# Patient Record
Sex: FEMALE | Race: WHITE | NOT HISPANIC OR LATINO | Employment: OTHER | URBAN - METROPOLITAN AREA
[De-identification: names, ages, dates, MRNs, and addresses within clinical notes are randomized per-mention and may not be internally consistent; named-entity substitution may affect disease eponyms.]

---

## 2021-10-04 ENCOUNTER — MYC MEDICAL ADVICE (OUTPATIENT)
Dept: FAMILY MEDICINE | Facility: CLINIC | Age: 41
End: 2021-10-04

## 2021-10-05 NOTE — PROGRESS NOTES
Assessment & Plan     Encounter for screening for viral disease  covid testing ordered. Advised pt get on [employer] for mobile access to results.   - Asymptomatic COVID-19 Virus (Coronavirus) by PCR; Future    History of depression  Reviewed PHQ9 in detail. Her sx are chronic/stable. She is not interested in restarting a medication at this time  Invited to return to discuss if she changes her mind  Declined counseling referral    Encouraged to return for well woman exam, screening labs, pap, etc.          Depression Screening Follow Up    PHQ 10/7/2021   PHQ-9 Total Score 8   Q9: Thoughts of better off dead/self-harm past 2 weeks Several days   F/U: Thoughts of suicide or self-harm No   F/U: Safety concerns No       Return in about 2 weeks (around 10/21/2021) for Physical Exam.    Nevin Gamboa PA-C  [facility] EMERSON Sellers is a 40 year old who presents for the following health issues  History of Present Illness       She eats 2-3 servings of fruits and vegetables daily.She consumes 1 sweetened beverage(s) daily.She exercises with enough effort to increase her heart rate 9 or less minutes per day.  She exercises with enough effort to increase her heart rate 3 or less days per week.      Needs Covid test for travel- a Cruise leaving Saturday   Pt was vaccinated 09/18/2021- [employer].   Patient is leaving on 10/9 for her 7 day cruise. Needs negative covid test.   No known recent exposure to covid. 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  No recent sx at all- of cough, fever/chills, CP, URI sx, smell/taste change, etc.     Has been many years since needed to be seen. Healthy. No meds. Been a few years since prevent visit.       PHQ 10/7/2021   PHQ-9 Total Score 8   Q9: Thoughts of better off dead/self-harm past 2 weeks Several days   F/U: Thoughts of suicide or self-harm No   F/U: Safety concerns No   I have been on antidepressants for  "years.  Anxiety and depression.   Off meds the last 3-4 yrs. Stressful year. Daughter is a senior in high school.  I don't like being on medications.  Meds worked when needed them. Thinks last took celexa.   SI on questionnaire- \"It's true but its not that bad. Regular daily occurrence since teen years\"- brief, passive, stable pattern, no plan, no intent.           Review of Systems   Constitutional, HEENT, cardiovascular, pulmonary, gi and gu systems are negative, except as otherwise noted.      Objective    /66   Pulse 76   Temp 97.1  F (36.2  C) (Temporal)   Resp 16   Wt 68.8 kg (151 lb 11.2 oz)   LMP 09/22/2021 (Approximate)   SpO2 97%   Breastfeeding No   There is no height or weight on file to calculate BMI.  Physical Exam   GENERAL: healthy, alert and no distress  EYES: Eyes grossly normal to inspection, PERRL and conjunctivae and sclerae normal  HENT: ear canals and TM's normal, nose and mouth without ulcers or lesions  NECK: no adenopathy, no asymmetry, masses, or scars and thyroid normal to palpation  RESP: lungs clear to auscultation - no rales, rhonchi or wheezes  CV: regular rate and rhythm, normal S1 S2, no S3 or S4, no murmur, click or rub, no peripheral edema and peripheral pulses strong  ABDOMEN: soft, nontender, no hepatosplenomegaly, no masses and bowel sounds normal  MS: no gross musculoskeletal defects noted, no edema  NEURO: Normal strength and tone, mentation intact and speech normal  PSYCH: mentation appears normal, affect normal/bright                Answers for HPI/ROS submitted by the patient on 10/7/2021  If you checked off any problems, how difficult have these problems made it for you to do your work, take care of things at home, or get along with other people?: Not difficult at all  PHQ9 TOTAL SCORE: 8      "

## 2021-10-07 ENCOUNTER — OFFICE VISIT (OUTPATIENT)
Dept: FAMILY MEDICINE | Facility: CLINIC | Age: 41
End: 2021-10-07
Payer: COMMERCIAL

## 2021-10-07 VITALS
TEMPERATURE: 97.1 F | WEIGHT: 151.7 LBS | OXYGEN SATURATION: 97 % | HEART RATE: 76 BPM | RESPIRATION RATE: 16 BRPM | SYSTOLIC BLOOD PRESSURE: 102 MMHG | DIASTOLIC BLOOD PRESSURE: 66 MMHG

## 2021-10-07 DIAGNOSIS — Z11.59 ENCOUNTER FOR SCREENING FOR VIRAL DISEASE: Primary | ICD-10-CM

## 2021-10-07 DIAGNOSIS — Z86.59 HISTORY OF DEPRESSION: ICD-10-CM

## 2021-10-07 PROCEDURE — U0003 INFECTIOUS AGENT DETECTION BY NUCLEIC ACID (DNA OR RNA); SEVERE ACUTE RESPIRATORY SYNDROME CORONAVIRUS 2 (SARS-COV-2) (CORONAVIRUS DISEASE [COVID-19]), AMPLIFIED PROBE TECHNIQUE, MAKING USE OF HIGH THROUGHPUT TECHNOLOGIES AS DESCRIBED BY CMS-2020-01-R: HCPCS | Performed by: PHYSICIAN ASSISTANT

## 2021-10-07 PROCEDURE — U0005 INFEC AGEN DETEC AMPLI PROBE: HCPCS | Performed by: PHYSICIAN ASSISTANT

## 2021-10-07 PROCEDURE — 99203 OFFICE O/P NEW LOW 30 MIN: CPT | Performed by: PHYSICIAN ASSISTANT

## 2021-10-07 ASSESSMENT — PATIENT HEALTH QUESTIONNAIRE - PHQ9
SUM OF ALL RESPONSES TO PHQ QUESTIONS 1-9: 8
10. IF YOU CHECKED OFF ANY PROBLEMS, HOW DIFFICULT HAVE THESE PROBLEMS MADE IT FOR YOU TO DO YOUR WORK, TAKE CARE OF THINGS AT HOME, OR GET ALONG WITH OTHER PEOPLE: NOT DIFFICULT AT ALL
SUM OF ALL RESPONSES TO PHQ QUESTIONS 1-9: 8

## 2021-10-07 ASSESSMENT — PAIN SCALES - GENERAL: PAINLEVEL: NO PAIN (0)

## 2021-10-08 LAB — SARS-COV-2 RNA RESP QL NAA+PROBE: NEGATIVE

## 2021-10-08 ASSESSMENT — PATIENT HEALTH QUESTIONNAIRE - PHQ9: SUM OF ALL RESPONSES TO PHQ QUESTIONS 1-9: 8

## 2021-10-17 ENCOUNTER — HEALTH MAINTENANCE LETTER (OUTPATIENT)
Age: 41
End: 2021-10-17

## 2021-12-22 ENCOUNTER — OFFICE VISIT (OUTPATIENT)
Dept: FAMILY MEDICINE | Facility: CLINIC | Age: 41
End: 2021-12-22
Payer: COMMERCIAL

## 2021-12-22 VITALS
SYSTOLIC BLOOD PRESSURE: 128 MMHG | WEIGHT: 140 LBS | RESPIRATION RATE: 16 BRPM | OXYGEN SATURATION: 98 % | TEMPERATURE: 98.6 F | HEART RATE: 84 BPM | DIASTOLIC BLOOD PRESSURE: 74 MMHG

## 2021-12-22 DIAGNOSIS — J02.9 SORE THROAT: Primary | ICD-10-CM

## 2021-12-22 LAB
DEPRECATED S PYO AG THROAT QL EIA: NEGATIVE
GROUP A STREP BY PCR: NOT DETECTED

## 2021-12-22 PROCEDURE — U0005 INFEC AGEN DETEC AMPLI PROBE: HCPCS | Performed by: FAMILY MEDICINE

## 2021-12-22 PROCEDURE — U0003 INFECTIOUS AGENT DETECTION BY NUCLEIC ACID (DNA OR RNA); SEVERE ACUTE RESPIRATORY SYNDROME CORONAVIRUS 2 (SARS-COV-2) (CORONAVIRUS DISEASE [COVID-19]), AMPLIFIED PROBE TECHNIQUE, MAKING USE OF HIGH THROUGHPUT TECHNOLOGIES AS DESCRIBED BY CMS-2020-01-R: HCPCS | Performed by: FAMILY MEDICINE

## 2021-12-22 PROCEDURE — 87651 STREP A DNA AMP PROBE: CPT | Performed by: FAMILY MEDICINE

## 2021-12-22 PROCEDURE — 99214 OFFICE O/P EST MOD 30 MIN: CPT | Performed by: FAMILY MEDICINE

## 2021-12-22 RX ORDER — AMOXICILLIN 875 MG
875 TABLET ORAL 2 TIMES DAILY
Qty: 14 TABLET | Refills: 0 | Status: SHIPPED | OUTPATIENT
Start: 2021-12-22 | End: 2021-12-29

## 2021-12-22 NOTE — PROGRESS NOTES
Assessment & Plan     Sore throat  Symptoms began about 5 days ago with mild cold symptoms and a scratchy throat.  The other illness symptoms have faded but she has a very severe sore throat.  No loss of smell or taste.  No fever or chills and not particularly fatigued.  Had Damian & Damian vaccine in September and does not know of any particular exposures.  Rapid strep negative.  I obtained a Covid swab today.  Given the focal symptoms I am okay with starting antibiotic therapy until we get culture results back.  We will contact patient with those results.  - Streptococcus A Rapid Screen w/Reflex to PCR - Clinic Collect  - Group A Streptococcus PCR Throat Swab  - amoxicillin (AMOXIL) 875 MG tablet; Take 1 tablet (875 mg) by mouth 2 times daily for 7 days  - Symptomatic; Yes; 12/18/2021 COVID-19 Virus (Coronavirus) by PCR Nose       See Patient Instructions    Return in about 2 days (around 12/24/2021) for Based upon lab results.    Peyton Collins MD  [facility]    Yolanda Sellers is a 40 year old who presents for the following health issues     HPI     Acute Illness  Acute illness concerns: strep throat  Onset/Duration: saturday  Symptoms:  Fever: YES- low grade  Chills/Sweats: no  Headache (location?): no  Sinus Pressure: no  Conjunctivitis:  no  Ear Pain: YES- on and off  Rhinorrhea: no  Congestion: no  Sore Throat: YES  Cough: YES-dry  Wheeze: no  Decreased Appetite: no  Nausea: no  Vomiting: no  Diarrhea: no  Dysuria/Freq.: no  Dysuria or Hematuria: no  Fatigue/Achiness: YES  Sick/Strep Exposure: YES- daughter was feeling sick over the weekend  Therapies tried and outcome: dayquil, nyquil,     Here today for illness symptoms as above.  Primarily sore throat.    Review of Systems   Constitutional, HEENT, cardiovascular, pulmonary, gi and gu systems are negative, except as otherwise noted.      Objective    /74   Pulse 84   Temp 98.6  F (37  C) (Temporal)   Resp 16    Wt 63.5 kg (140 lb)   SpO2 98%   There is no height or weight on file to calculate BMI.  Physical Exam   Alert, pleasant, upbeat, and in no apparent discomfort.  Ears normal. Throat and pharynx normal. Neck supple. No adenopathy or masses in the neck or supraclavicular regions. Sinuses non tender.  S1 and S2 normal, no murmurs, clicks, gallops or rubs. Regular rate and rhythm. Chest is clear; no wheezes or rales. No edema or JVD.  Past labs reviewed with the patient.     Rapid strep negative

## 2021-12-23 ENCOUNTER — TELEPHONE (OUTPATIENT)
Dept: FAMILY MEDICINE | Facility: CLINIC | Age: 41
End: 2021-12-23

## 2021-12-23 ENCOUNTER — TELEPHONE (OUTPATIENT)
Dept: FAMILY MEDICINE | Facility: CLINIC | Age: 41
End: 2021-12-23
Payer: COMMERCIAL

## 2021-12-23 LAB — SARS-COV-2 RNA RESP QL NAA+PROBE: POSITIVE

## 2021-12-23 NOTE — RESULT ENCOUNTER NOTE
Verito,  Your final strep test was again negative. The covid-19 test is still processing.  Please MyChart or call if you have any concerns or questions.   Sincerely,  Annalise Clinton MD  (for Dr. Collins who is out of the office today)

## 2021-12-24 NOTE — TELEPHONE ENCOUNTER
Coronavirus (COVID-19) Notification    Reason for call  Notify of POSITIVE  COVID-19 lab result, assess symptoms,  review [facility] recommendations    Lab Result   Lab test for 2019-nCoV rRt-PCR or SARS-COV-2 PCR  Oropharyngeal AND/OR nasopharyngeal swabs were POSITIVE for 2019-nCoV RNA [OR] SARS-COV-2 RNA (COVID-19) RNA     We have been unable to reach Patient by phone at this time to notify of their Positive COVID-19 result.  Left voicemail message requesting a call back to [phone] [facility] for results.        POSITIVE COVID-19 Letter sent.    Unable to leave message, phone disconnected.  Jo Ann Cueto LPN

## 2022-11-21 ENCOUNTER — HEALTH MAINTENANCE LETTER (OUTPATIENT)
Age: 42
End: 2022-11-21

## 2023-03-13 ENCOUNTER — TRANSFERRED RECORDS (OUTPATIENT)
Dept: HEALTH INFORMATION MANAGEMENT | Facility: CLINIC | Age: 43
End: 2023-03-13

## 2023-03-27 ENCOUNTER — TRANSFERRED RECORDS (OUTPATIENT)
Dept: HEALTH INFORMATION MANAGEMENT | Facility: CLINIC | Age: 43
End: 2023-03-27

## 2023-11-26 ENCOUNTER — HEALTH MAINTENANCE LETTER (OUTPATIENT)
Age: 43
End: 2023-11-26

## 2024-02-04 ENCOUNTER — HEALTH MAINTENANCE LETTER (OUTPATIENT)
Age: 44
End: 2024-02-04

## 2024-04-04 ENCOUNTER — TRANSCRIBE ORDERS (OUTPATIENT)
Dept: OTHER | Age: 44
End: 2024-04-04

## 2024-04-04 ENCOUNTER — MEDICAL CORRESPONDENCE (OUTPATIENT)
Dept: HEALTH INFORMATION MANAGEMENT | Facility: CLINIC | Age: 44
End: 2024-04-04
Payer: COMMERCIAL

## 2024-04-04 ENCOUNTER — PRE VISIT (OUTPATIENT)
Dept: OTHER | Age: 44
End: 2024-04-04

## 2024-04-04 ENCOUNTER — PATIENT OUTREACH (OUTPATIENT)
Dept: ONCOLOGY | Facility: CLINIC | Age: 44
End: 2024-04-04
Payer: COMMERCIAL

## 2024-04-04 ENCOUNTER — TRANSFERRED RECORDS (OUTPATIENT)
Dept: HEALTH INFORMATION MANAGEMENT | Facility: CLINIC | Age: 44
End: 2024-04-04
Payer: COMMERCIAL

## 2024-04-04 DIAGNOSIS — R79.89 ELEVATED SERUM HUMAN CHORIONIC GONADOTROPIN (HCG) LEVEL IN FEMALE, NOT PREGNANT: Primary | ICD-10-CM

## 2024-04-04 NOTE — PROGRESS NOTES
"New Patient Hematology / Oncology Nurse Navigator Note     Referral Date: 4/4/24    Referring provider:   Lyric Marr MD   [address] [place] [address]   Phone: [phone]   Fax: [phone]       Referring Clinic/Organization: Other - Clinic [facility]     Referred to: GynOnc    Requested provider (if applicable): First available - did not specify     Evaluation for : \"Abnormal quantitiative HCG s/p Ultrasound guided suction D&C, Concern for possible invasive gestational cells \"       Clinical History (per Nurse review of records provided):    3/26/24 Path:  Final Diagnosis    Uterine contents - Endometrium with gestational-type changes and previous implantation site. No chorionic villi identified.   HGC: 2/21/24 (7180)> 2/28/24 (945)> 3/11/24 (223)<3/19/24 (247), 3/22/24 (275)<4/2/24 (318) -- BOOKMARKED  Most recent HCG  4/2    3/26/24 Op Note: SURG RX MISSED ABORTN,1ST TRI   ULTRASONIC GUIDANCE, INTRAOPE* SUCTION DILATION AND CURETTAGE WITH ULTRASOUND GUIDANCE  -- BOOKMARKED  Relevant imaging -- BOOKMARKED    Clinical Assessment / Barriers to Care (Per Nurse):  Pt lives in [place], [place]      Records Location: [facility]   Faxed - Media tab/Scanned     Records Needed:   Images from [facility]    Additional testing needed prior to consult:   N/A    Referral updates and Plan:   OUTGOING CALL to pt:  Introduced my role as nurse navigator with [facility] Hematology/Oncology dept and that we have recd the referral to GynOnc from Dr Marr.  Pt confirms they are aware of the referral but would like to check with her insurance to confirm we are in network.     Provided contact information if future questions arise.      Tentative hold placed on Dr. Sommer's schedule Monday 4/8/24 0815 at [facility] pending pt's insurance coverage. Will have NPS follow-up tomorrow to schedule accordingly.     Dayanna Sutherland, BSN, RN, PHN, OCN  Hematology/Oncology Nurse Navigator  [facility] " Care  [phone]

## 2024-04-05 NOTE — TELEPHONE ENCOUNTER
RECORDS STATUS - ALL OTHER DIAGNOSIS      Action April 4, 2024 11:45 AM    Action Taken - Request is faxed to [facility] to email image.   - Called and left  for [facility] to push img.      Imaging Received  April 5, 2024 2:45 PM    Action: Images from [facility] received and resolved to PACS.     RECORDS RECEIVED FROM: [facility]   DATE RECEIVED:    NOTES STATUS DETAILS   OFFICE NOTE from referring provider External: [facility] 3/21/24: Dr. Lyric Marr   OPERATIVE REPORT [facility]- [facility] 3/26/24: SURG RX MISSED ABORTN,1ST TRI   ULTRASONIC GUIDANCE, INTRAOPE* SUCTION DILATION AND CURETTAGE WITH ULTRASOUND GUIDANCE    MEDICATION LIST External: [facility]    LABS     PATHOLOGY REPORTS Report in CE- NM 3/26/24: [id number]    ANYTHING RELATED TO DIAGNOSIS CE- NM Most recent 3/26/24   IMAGING (NEED IMAGES & REPORT)     ULTRASOUND (Img req from /[facility]) [facility]:   3/26/24: US Intraoperative    [facility]:  3/21/24: US Pel

## 2024-04-05 NOTE — PROGRESS NOTES
Gynecologic Oncology Clinic - New Patient    Referring provider:    Lyric Marr MD  [address] [address]    Patient: Verito Yee  : 1980    Date of Visit: 2024     Reason for visit: Rising HCG    History of Present Illness:  Verito Yee is a 43 year old  patient with a rising HCG in the setting of a recent  SAB with retained POCs on ultrasound prior to D&C.    2/15/24 TVUS: nonviable fetal pole measuring 6w0d. Normal bilateral adnexa. The patient underwent expectant management.     2 weeks later she began having heavy bleeding and cramping.  This lasted 2 weeks.     : HCG 7180  :    3/11:   3/19:     3/21/24 TVUS: Retroverted uterus, endometrium with heterogenous material with vascularity. Normal bilateral ovaries, no free fluid.     3/22:     3/26: D&C   Path: Endometrium with gestational type changes and previous implantation site, no chorionic villi identified.     :     She started spotting 2 days ago.  Only one day of light spotting.  Normally has monthly menses, lasting 4 days.  Not preventing a pregnancy.  She has no other concerns or complaints today.    Screening History  Colonoscopy: never  Mammogram: Unsure   Cervical cancer screenin normal    OB/Gynecologic History:  Deliveries: ,  x2    Cervical cancer screening: See screening history above. Additional details:   Regular screening: Yes  History of abnormal: No      History reviewed. No pertinent past medical history.    Past Surgical History:   Procedure Laterality Date    DILATION AND CURETTAGE      TONSILLECTOMY          Social History     Tobacco Use    Smoking status: Never    Smokeless tobacco: Never   Vaping Use    Vaping Use: Never used   Substance Use Topics    Alcohol use: Yes     Comment: 6 drinks per week    Drug use: Never   Current living situation: Lives in [place] with her son and fiance and occasionally his 4  "children.      Family History   Problem Relation Age of Onset    Heart Disease Father          Allergies  No Known Allergies    Current Outpatient Medications   Medication Sig Dispense Refill    norethindrone-ethinyl estradiol (MICROGESTIN ) 1-20 MG-MCG tablet Take 1 tablet by mouth daily 30 tablet 2     No current facility-administered medications for this visit.       Physical Exam:   BP (!) 141/92 (BP Location: Right arm)   Pulse 98   Resp 16   Ht 1.626 m (5' 4\")   Wt 69.2 kg (152 lb 9.6 oz)   LMP 2024 (Exact Date)   SpO2 95%   BMI 26.19 kg/m    General Appearance: healthy and alert, no distress        Cardiovascular: regular rate and rhythm    Respiratory: lungs clear     Gastrointestinal:       abdomen soft, non-tender, non-distended, no organomegaly or masses    Genitourinary: External genitalia and urethral meatus appears normal.  Vagina is smooth without nodularity or masses.  Cervix appears normal and without lesions.  Bimanual exam reveal no masses, nodularity or fullness.      Labs/Pathology:   As above    Imaging:   As above      Assessment:  Verito Yee is a 43 year old  patient with a rising HCG in the setting of a recent SAB with retained POCs on ultrasound prior to D&C.    Plan:   Discussed with the patient that her hCG is not downtrending appropriately.  The major concern in this situation is GTN.  Her clinical picture is not completely clear given her recent SAB.  I did discuss that her pathology on her D&C did not reveal any chorionic villi.  Reviewed that it is possible that there is still some retained products, though I am concerned that her hCG has risen and there may be something more going on.  Discussed my recommendation to obtain a repeat ultrasound to assess for any continued retained products.  If there is evidence that there is still retained products, then I would recommend we repeat the D&C as this may still be retained POC's.  If her ultrasound is negative " for any evidence of retained products or ectopic pregnancy, I would recommend completing staging for GTN with a CT chest abdomen pelvis.  She technically does meet criteria for GTN with a plateau of 4 values over 3 weeks despite a interim D&C but I do think this is difficult to assess in the setting retained products of conception, thus we will get more information prior to proceeding with treatment for GTN.  Briefly discussed options for management if we ultimately decide this is GTN and that this would be dependent on her WHO score.  I also discussed the importance of contraception during this time.  I would recommend that we start her on OCPs as this may also suppress other possible contributors to an elevated hCG.  We will plan to obtain another hCG on 4/9.   -TVUS ordered   -plan for D&C under US guidance in the  surgery center   -[facility] to be drawn on 4/9    Carson Sommer MD

## 2024-04-08 ENCOUNTER — ANCILLARY PROCEDURE (OUTPATIENT)
Dept: ULTRASOUND IMAGING | Facility: OTHER | Age: 44
End: 2024-04-08
Attending: OBSTETRICS & GYNECOLOGY
Payer: COMMERCIAL

## 2024-04-08 ENCOUNTER — PATIENT OUTREACH (OUTPATIENT)
Dept: ONCOLOGY | Facility: CLINIC | Age: 44
End: 2024-04-08

## 2024-04-08 ENCOUNTER — HOSPITAL ENCOUNTER (OUTPATIENT)
Facility: AMBULATORY SURGERY CENTER | Age: 44
End: 2024-04-08
Attending: OBSTETRICS & GYNECOLOGY
Payer: COMMERCIAL

## 2024-04-08 ENCOUNTER — ONCOLOGY VISIT (OUTPATIENT)
Dept: ONCOLOGY | Facility: CLINIC | Age: 44
End: 2024-04-08
Attending: OBSTETRICS & GYNECOLOGY
Payer: COMMERCIAL

## 2024-04-08 ENCOUNTER — TELEPHONE (OUTPATIENT)
Dept: ONCOLOGY | Facility: CLINIC | Age: 44
End: 2024-04-08

## 2024-04-08 VITALS
BODY MASS INDEX: 26.05 KG/M2 | HEART RATE: 98 BPM | RESPIRATION RATE: 16 BRPM | DIASTOLIC BLOOD PRESSURE: 92 MMHG | OXYGEN SATURATION: 95 % | SYSTOLIC BLOOD PRESSURE: 141 MMHG | HEIGHT: 64 IN | WEIGHT: 152.6 LBS

## 2024-04-08 DIAGNOSIS — R79.89 ELEVATED SERUM HCG: Primary | ICD-10-CM

## 2024-04-08 DIAGNOSIS — R79.89 ELEVATED SERUM HCG: ICD-10-CM

## 2024-04-08 PROCEDURE — 99204 OFFICE O/P NEW MOD 45 MIN: CPT | Performed by: OBSTETRICS & GYNECOLOGY

## 2024-04-08 PROCEDURE — 76856 US EXAM PELVIC COMPLETE: CPT | Mod: TC | Performed by: RADIOLOGY

## 2024-04-08 PROCEDURE — G0463 HOSPITAL OUTPT CLINIC VISIT: HCPCS | Performed by: OBSTETRICS & GYNECOLOGY

## 2024-04-08 PROCEDURE — 76830 TRANSVAGINAL US NON-OB: CPT | Mod: TC | Performed by: RADIOLOGY

## 2024-04-08 RX ORDER — FENTANYL CITRATE 50 UG/ML
25 INJECTION, SOLUTION INTRAMUSCULAR; INTRAVENOUS EVERY 5 MIN PRN
Status: CANCELLED | OUTPATIENT
Start: 2024-04-08

## 2024-04-08 RX ORDER — ONDANSETRON 2 MG/ML
4 INJECTION INTRAMUSCULAR; INTRAVENOUS EVERY 30 MIN PRN
Status: CANCELLED | OUTPATIENT
Start: 2024-04-08

## 2024-04-08 RX ORDER — NORETHINDRONE ACETATE AND ETHINYL ESTRADIOL .02; 1 MG/1; MG/1
1 TABLET ORAL DAILY
Qty: 30 TABLET | Refills: 2 | Status: SHIPPED | OUTPATIENT
Start: 2024-04-08 | End: 2024-05-29

## 2024-04-08 RX ORDER — PHENAZOPYRIDINE HYDROCHLORIDE 100 MG/1
200 TABLET, FILM COATED ORAL ONCE
Status: CANCELLED | OUTPATIENT
Start: 2024-04-08 | End: 2024-04-08

## 2024-04-08 RX ORDER — ACETAMINOPHEN 325 MG/1
975 TABLET ORAL ONCE
Status: CANCELLED | OUTPATIENT
Start: 2024-04-08 | End: 2024-04-08

## 2024-04-08 RX ORDER — LIDOCAINE 40 MG/G
CREAM TOPICAL
Status: CANCELLED | OUTPATIENT
Start: 2024-04-08

## 2024-04-08 RX ORDER — FENTANYL CITRATE 50 UG/ML
50 INJECTION, SOLUTION INTRAMUSCULAR; INTRAVENOUS EVERY 5 MIN PRN
Status: CANCELLED | OUTPATIENT
Start: 2024-04-08

## 2024-04-08 RX ORDER — DOXYCYCLINE 100 MG/1
200 CAPSULE ORAL ONCE
Qty: 2 CAPSULE | Refills: 0 | Status: CANCELLED | OUTPATIENT
Start: 2024-04-08 | End: 2024-04-08

## 2024-04-08 RX ORDER — SODIUM CHLORIDE, SODIUM LACTATE, POTASSIUM CHLORIDE, CALCIUM CHLORIDE 600; 310; 30; 20 MG/100ML; MG/100ML; MG/100ML; MG/100ML
INJECTION, SOLUTION INTRAVENOUS CONTINUOUS
Status: CANCELLED | OUTPATIENT
Start: 2024-04-08

## 2024-04-08 RX ORDER — ONDANSETRON 4 MG/1
4 TABLET, ORALLY DISINTEGRATING ORAL EVERY 30 MIN PRN
Status: CANCELLED | OUTPATIENT
Start: 2024-04-08

## 2024-04-08 RX ORDER — HEPARIN SODIUM 10000 [USP'U]/ML
5000 INJECTION, SOLUTION INTRAVENOUS; SUBCUTANEOUS
Status: CANCELLED | OUTPATIENT
Start: 2024-04-08

## 2024-04-08 RX ORDER — NALOXONE HYDROCHLORIDE 0.4 MG/ML
0.1 INJECTION, SOLUTION INTRAMUSCULAR; INTRAVENOUS; SUBCUTANEOUS
Status: CANCELLED | OUTPATIENT
Start: 2024-04-08

## 2024-04-08 ASSESSMENT — PAIN SCALES - GENERAL: PAINLEVEL: NO PAIN (0)

## 2024-04-08 NOTE — TELEPHONE ENCOUNTER
Spoke with the patient and was able to confirm all scheduled information.     Patient is schedule for surgery with: Dr. Sommer    Surgery Date: 4/9     Location: [facility]    H&P: already completed by surgeon will complete and/or update on day of surgery as needed      Post-op: will be scheduled by the clinic    Patient will receive a phone call from pre-admission nurses 1-2 days prior to surgery with arrival time and NPO instructions.    Patient aware times are subject to change up until day before surgery.     Patient questions/concerns:  Patient asked about hcg hormone lab she has scheduled for 4/9 ay 1330. Per Dr. Sommer, they will draw lab in the AM prior to surgery. RNCC Angela was to call patient to let them know the plan.       Surgery packet was provided to patient during appointment      Clementine Rivas on 4/8/2024 at 3:07 PM

## 2024-04-08 NOTE — LETTER
2024         RE: Verito Yee  [address] [facility] [address]        Dear Colleague,    Thank you for referring your patient, Verito Yee, to the [facility]. Please see a copy of my visit note below.    Gynecologic Oncology Clinic - New Patient    Referring provider:    Lyric Marr MD  [address] [address]    Patient: Verito Yee  : 1980    Date of Visit: 2024     Reason for visit: Rising HCG    History of Present Illness:  Verito Yee is a 43 year old  patient with a rising HCG in the setting of a recent  SAB with retained POCs on ultrasound prior to D&C.    2/15/24 TVUS: nonviable fetal pole measuring 6w0d. Normal bilateral adnexa. The patient underwent expectant management.     2 weeks later she began having heavy bleeding and cramping.  This lasted 2 weeks.     : HCG 7180  :    3/11:   3/19:     3/21/24 TVUS: Retroverted uterus, endometrium with heterogenous material with vascularity. Normal bilateral ovaries, no free fluid.     3/22:     3/26: D&C   Path: Endometrium with gestational type changes and previous implantation site, no chorionic villi identified.     :     She started spotting 2 days ago.  Only one day of light spotting.  Normally has monthly menses, lasting 4 days.  Not preventing a pregnancy.  She has no other concerns or complaints today.    Screening History  Colonoscopy: never  Mammogram: Unsure   Cervical cancer screenin normal    OB/Gynecologic History:  Deliveries: ,  x2    Cervical cancer screening: See screening history above. Additional details:   Regular screening: Yes  History of abnormal: No      History reviewed. No pertinent past medical history.    Past Surgical History:   Procedure Laterality Date     DILATION AND CURETTAGE       TONSILLECTOMY          Social History     Tobacco Use     Smoking status: Never      "Smokeless tobacco: Never   Vaping Use     Vaping Use: Never used   Substance Use Topics     Alcohol use: Yes     Comment: 6 drinks per week     Drug use: Never   Current living situation: Lives in [place] with her son and faustinoance and occasionally his 4 children.      Family History   Problem Relation Age of Onset     Heart Disease Father          Allergies  No Known Allergies    Current Outpatient Medications   Medication Sig Dispense Refill     norethindrone-ethinyl estradiol (MICROGESTIN ) 1-20 MG-MCG tablet Take 1 tablet by mouth daily 30 tablet 2     No current facility-administered medications for this visit.       Physical Exam:   BP (!) 141/92 (BP Location: Right arm)   Pulse 98   Resp 16   Ht 1.626 m (5' 4\")   Wt 69.2 kg (152 lb 9.6 oz)   LMP 2024 (Exact Date)   SpO2 95%   BMI 26.19 kg/m    General Appearance: healthy and alert, no distress        Cardiovascular: regular rate and rhythm    Respiratory: lungs clear     Gastrointestinal:       abdomen soft, non-tender, non-distended, no organomegaly or masses    Genitourinary: External genitalia and urethral meatus appears normal.  Vagina is smooth without nodularity or masses.  Cervix appears normal and without lesions.  Bimanual exam reveal no masses, nodularity or fullness.      Labs/Pathology:   As above    Imaging:   As above      Assessment:  Verito Yee is a 43 year old  patient with a rising HCG in the setting of a recent SAB with retained POCs on ultrasound prior to D&C.    Plan:   Discussed with the patient that her hCG is not downtrending appropriately.  The major concern in this situation is GTN.  Her clinical picture is not completely clear given her recent SAB.  I did discuss that her pathology on her D&C did not reveal any chorionic villi.  Reviewed that it is possible that there is still some retained products, though I am concerned that her hCG has risen and there may be something more going on.  Discussed my " recommendation to obtain a repeat ultrasound to assess for any continued retained products.  If there is evidence that there is still retained products, then I would recommend we repeat the D&C as this may still be retained POC's.  If her ultrasound is negative for any evidence of retained products or ectopic pregnancy, I would recommend completing staging for GTN with a CT chest abdomen pelvis.  She technically does meet criteria for GTN with a plateau of 4 values over 3 weeks despite a interim D&C but I do think this is difficult to assess in the setting retained products of conception, thus we will get more information prior to proceeding with treatment for GTN.  Briefly discussed options for management if we ultimately decide this is GTN and that this would be dependent on her WHO score.  I also discussed the importance of contraception during this time.  I would recommend that we start her on OCPs as this may also suppress other possible contributors to an elevated hCG.  We will plan to obtain another hCG on 4/9.   -TVUS ordered   -plan for D&C under US guidance in the  surgery center   -[facility] to be drawn on 4/9    Carson Sommer MD           Again, thank you for allowing me to participate in the care of your patient.        Sincerely,        Carson Sommer MD

## 2024-04-08 NOTE — TELEPHONE ENCOUNTER
Per a text  writer rec'd from Dr. Sommer on 4/8, patients US came back negative and procedure needed to be cancelled. Writer called over to [facility] and cancelled procedure. Dr. Sommer stated that she would follow up with the patient. Clementine Rivas on 4/8/2024 at 3:59 PM

## 2024-04-08 NOTE — PROGRESS NOTES
[facility], Gynecologic Oncology:  Pre-op Education Note    Relevant Diagnosis:  Rising HCG     Procedure:  DILATION AND CURETTAGE, UTERUS, USING SUCTION, WITH ULTRASOUND GUIDANCE     Procedure Date: 4/9/24    Person(s) involved in teaching:  Patient and significant other    Education was completed: in person.    Motivation Level:    Asks Questions:   Yes  Eager to Learn:  Yes  Cooperative:  Yes  Receptive (willing/able to accept information):  Yes    Patient demonstrates understanding of the following:  Reason for the appointment, diagnosis and treatment plan:  Yes  Knowledge of proper use of medications and conditions for which they are ordered (with special attention to potential side effects or drug interactions):  Yes  Which situations necessitate calling provider and whom to contact:  Yes    Teaching Concerns:  No    Education/Instructional Materials Used/Given:     Before Your Surgery Booklet ([place])  Showering Before Surgery; A bottle of CHG soap was provided.  Lymphedema: A Patient Resource Guide  Hysterectomy Guidelines Yes  Educational Handout Pertaining to Procedure and/or Diagnosis: Yes  Home Care After Gynecologic Surgery  [facility]  Accommodations Brochure   Phone numbers for [facility] and After-Hours Line    Pre-op tests:   EKG: N/A  Labs: to be done  Chest X-Ray: N/A    Pre-op appointment: today    Post-op appointment: clinic to make    Time spent teaching with patient:  20 minutes    Hysterectomy Acknowledgement Statement form signed today: N/A - not required.    E-Consent for surgery signed today: Yes.    E-Consent for possible blood transfusion signed today: Yes.    Surgery scheduling team at [facility] was notified, and will be contacting patient directly to schedule her surgery.  Encouraged patient to call with any questions or concerns in the meantime.    Angela Raza, RN, BSN, OCN  RN Care Coordinator - Oncology  [facility]  [facility]     Universal Safety Interventions

## 2024-04-08 NOTE — NURSING NOTE
"Oncology Rooming Note    April 8, 2024 8:26 AM   Verito Yee is a 43 year old female who presents for:    Chief Complaint   Patient presents with    Oncology Clinic Visit     New patient     Initial Vitals: BP (!) 141/92 (BP Location: Right arm)   Pulse 98   Resp 16   Ht 1.626 m (5' 4\")   Wt 69.2 kg (152 lb 9.6 oz)   LMP 04/08/2024 (Exact Date)   SpO2 95%   BMI 26.19 kg/m   Estimated body mass index is 26.19 kg/m  as calculated from the following:    Height as of this encounter: 1.626 m (5' 4\").    Weight as of this encounter: 69.2 kg (152 lb 9.6 oz). Body surface area is 1.77 meters squared.  No Pain (0) Comment: Data Unavailable   Patient's last menstrual period was 04/08/2024 (exact date).  Allergies reviewed: Yes  Medications reviewed: Yes    Medications: Medication refills not needed today.  Pharmacy name entered into [facility]: [id number] - [place], [place] - [address]    Frailty Screening:   Is the patient here for a new oncology consult visit in cancer care? 2. No      Clinical concerns: New patient       Angle Ayala LPN              "

## 2024-04-09 ENCOUNTER — LAB (OUTPATIENT)
Dept: LAB | Facility: CLINIC | Age: 44
End: 2024-04-09
Payer: COMMERCIAL

## 2024-04-09 ENCOUNTER — ANCILLARY PROCEDURE (OUTPATIENT)
Dept: CT IMAGING | Facility: CLINIC | Age: 44
End: 2024-04-09
Attending: OBSTETRICS & GYNECOLOGY
Payer: COMMERCIAL

## 2024-04-09 DIAGNOSIS — R79.89 ELEVATED SERUM HCG: ICD-10-CM

## 2024-04-09 LAB — HCG INTACT+B SERPL-ACNC: 266 MIU/ML

## 2024-04-09 PROCEDURE — 74177 CT ABD & PELVIS W/CONTRAST: CPT | Mod: GC | Performed by: RADIOLOGY

## 2024-04-09 PROCEDURE — 71260 CT THORAX DX C+: CPT | Mod: GC | Performed by: RADIOLOGY

## 2024-04-09 PROCEDURE — 84702 CHORIONIC GONADOTROPIN TEST: CPT

## 2024-04-09 PROCEDURE — 36415 COLL VENOUS BLD VENIPUNCTURE: CPT

## 2024-04-09 RX ORDER — IOPAMIDOL 755 MG/ML
84 INJECTION, SOLUTION INTRAVASCULAR ONCE
Status: COMPLETED | OUTPATIENT
Start: 2024-04-09 | End: 2024-04-09

## 2024-04-09 RX ADMIN — IOPAMIDOL 84 ML: 755 INJECTION, SOLUTION INTRAVASCULAR at 09:57

## 2024-04-10 DIAGNOSIS — O01.9 GESTATIONAL TROPHOBLASTIC NEOPLASM: Primary | ICD-10-CM

## 2024-04-10 RX ORDER — DIPHENHYDRAMINE HYDROCHLORIDE 50 MG/ML
50 INJECTION INTRAMUSCULAR; INTRAVENOUS
Status: CANCELLED
Start: 2024-04-17

## 2024-04-10 RX ORDER — ALBUTEROL SULFATE 0.83 MG/ML
2.5 SOLUTION RESPIRATORY (INHALATION)
Status: CANCELLED | OUTPATIENT
Start: 2024-04-17

## 2024-04-10 RX ORDER — DIPHENHYDRAMINE HYDROCHLORIDE 50 MG/ML
50 INJECTION INTRAMUSCULAR; INTRAVENOUS
Status: CANCELLED
Start: 2024-04-18

## 2024-04-10 RX ORDER — HEPARIN SODIUM,PORCINE 10 UNIT/ML
5-20 VIAL (ML) INTRAVENOUS DAILY PRN
Status: CANCELLED | OUTPATIENT
Start: 2024-04-16

## 2024-04-10 RX ORDER — MEPERIDINE HYDROCHLORIDE 25 MG/ML
25 INJECTION INTRAMUSCULAR; INTRAVENOUS; SUBCUTANEOUS EVERY 30 MIN PRN
Status: CANCELLED | OUTPATIENT
Start: 2024-04-17

## 2024-04-10 RX ORDER — LORAZEPAM 2 MG/ML
0.5 INJECTION INTRAMUSCULAR EVERY 4 HOURS PRN
Status: CANCELLED | OUTPATIENT
Start: 2024-04-16

## 2024-04-10 RX ORDER — EPINEPHRINE 1 MG/ML
0.3 INJECTION, SOLUTION, CONCENTRATE INTRAVENOUS EVERY 5 MIN PRN
Status: CANCELLED | OUTPATIENT
Start: 2024-04-16

## 2024-04-10 RX ORDER — HEPARIN SODIUM,PORCINE 10 UNIT/ML
5-20 VIAL (ML) INTRAVENOUS DAILY PRN
Status: CANCELLED | OUTPATIENT
Start: 2024-04-15

## 2024-04-10 RX ORDER — EPINEPHRINE 1 MG/ML
0.3 INJECTION, SOLUTION, CONCENTRATE INTRAVENOUS EVERY 5 MIN PRN
Status: CANCELLED | OUTPATIENT
Start: 2024-04-15

## 2024-04-10 RX ORDER — DIPHENHYDRAMINE HYDROCHLORIDE 50 MG/ML
50 INJECTION INTRAMUSCULAR; INTRAVENOUS
Status: CANCELLED
Start: 2024-04-19

## 2024-04-10 RX ORDER — ALBUTEROL SULFATE 90 UG/1
1-2 AEROSOL, METERED RESPIRATORY (INHALATION)
Status: CANCELLED
Start: 2024-04-18

## 2024-04-10 RX ORDER — HEPARIN SODIUM,PORCINE 10 UNIT/ML
5-20 VIAL (ML) INTRAVENOUS DAILY PRN
Status: CANCELLED | OUTPATIENT
Start: 2024-04-17

## 2024-04-10 RX ORDER — MEPERIDINE HYDROCHLORIDE 25 MG/ML
25 INJECTION INTRAMUSCULAR; INTRAVENOUS; SUBCUTANEOUS EVERY 30 MIN PRN
Status: CANCELLED | OUTPATIENT
Start: 2024-04-18

## 2024-04-10 RX ORDER — HEPARIN SODIUM (PORCINE) LOCK FLUSH IV SOLN 100 UNIT/ML 100 UNIT/ML
5 SOLUTION INTRAVENOUS
Status: CANCELLED | OUTPATIENT
Start: 2024-04-17

## 2024-04-10 RX ORDER — EPINEPHRINE 1 MG/ML
0.3 INJECTION, SOLUTION, CONCENTRATE INTRAVENOUS EVERY 5 MIN PRN
Status: CANCELLED | OUTPATIENT
Start: 2024-04-19

## 2024-04-10 RX ORDER — DIPHENHYDRAMINE HYDROCHLORIDE 50 MG/ML
50 INJECTION INTRAMUSCULAR; INTRAVENOUS
Status: CANCELLED
Start: 2024-04-15

## 2024-04-10 RX ORDER — METHOTREXATE 25 MG/ML
0.4 INJECTION, SOLUTION INTRA-ARTERIAL; INTRAMUSCULAR; INTRAVENOUS ONCE
Status: CANCELLED | OUTPATIENT
Start: 2024-04-17 | End: 2024-04-17

## 2024-04-10 RX ORDER — LORAZEPAM 2 MG/ML
0.5 INJECTION INTRAMUSCULAR EVERY 4 HOURS PRN
Status: CANCELLED | OUTPATIENT
Start: 2024-04-17

## 2024-04-10 RX ORDER — EPINEPHRINE 1 MG/ML
0.3 INJECTION, SOLUTION, CONCENTRATE INTRAVENOUS EVERY 5 MIN PRN
Status: CANCELLED | OUTPATIENT
Start: 2024-04-17

## 2024-04-10 RX ORDER — HEPARIN SODIUM,PORCINE 10 UNIT/ML
5-20 VIAL (ML) INTRAVENOUS DAILY PRN
Status: CANCELLED | OUTPATIENT
Start: 2024-04-19

## 2024-04-10 RX ORDER — HEPARIN SODIUM (PORCINE) LOCK FLUSH IV SOLN 100 UNIT/ML 100 UNIT/ML
5 SOLUTION INTRAVENOUS
Status: CANCELLED | OUTPATIENT
Start: 2024-04-15

## 2024-04-10 RX ORDER — ALBUTEROL SULFATE 90 UG/1
1-2 AEROSOL, METERED RESPIRATORY (INHALATION)
Status: CANCELLED
Start: 2024-04-19

## 2024-04-10 RX ORDER — ALBUTEROL SULFATE 0.83 MG/ML
2.5 SOLUTION RESPIRATORY (INHALATION)
Status: CANCELLED | OUTPATIENT
Start: 2024-04-16

## 2024-04-10 RX ORDER — HEPARIN SODIUM (PORCINE) LOCK FLUSH IV SOLN 100 UNIT/ML 100 UNIT/ML
5 SOLUTION INTRAVENOUS
Status: CANCELLED | OUTPATIENT
Start: 2024-04-16

## 2024-04-10 RX ORDER — MEPERIDINE HYDROCHLORIDE 25 MG/ML
25 INJECTION INTRAMUSCULAR; INTRAVENOUS; SUBCUTANEOUS EVERY 30 MIN PRN
Status: CANCELLED | OUTPATIENT
Start: 2024-04-19

## 2024-04-10 RX ORDER — MEPERIDINE HYDROCHLORIDE 25 MG/ML
25 INJECTION INTRAMUSCULAR; INTRAVENOUS; SUBCUTANEOUS EVERY 30 MIN PRN
Status: CANCELLED | OUTPATIENT
Start: 2024-04-16

## 2024-04-10 RX ORDER — METHOTREXATE 25 MG/ML
0.4 INJECTION, SOLUTION INTRA-ARTERIAL; INTRAMUSCULAR; INTRAVENOUS ONCE
Status: CANCELLED | OUTPATIENT
Start: 2024-04-16 | End: 2024-04-16

## 2024-04-10 RX ORDER — ALBUTEROL SULFATE 0.83 MG/ML
2.5 SOLUTION RESPIRATORY (INHALATION)
Status: CANCELLED | OUTPATIENT
Start: 2024-04-19

## 2024-04-10 RX ORDER — EPINEPHRINE 1 MG/ML
0.3 INJECTION, SOLUTION, CONCENTRATE INTRAVENOUS EVERY 5 MIN PRN
Status: CANCELLED | OUTPATIENT
Start: 2024-04-18

## 2024-04-10 RX ORDER — ALBUTEROL SULFATE 90 UG/1
1-2 AEROSOL, METERED RESPIRATORY (INHALATION)
Status: CANCELLED
Start: 2024-04-16

## 2024-04-10 RX ORDER — DIPHENHYDRAMINE HYDROCHLORIDE 50 MG/ML
50 INJECTION INTRAMUSCULAR; INTRAVENOUS
Status: CANCELLED
Start: 2024-04-16

## 2024-04-10 RX ORDER — ALBUTEROL SULFATE 0.83 MG/ML
2.5 SOLUTION RESPIRATORY (INHALATION)
Status: CANCELLED | OUTPATIENT
Start: 2024-04-18

## 2024-04-10 RX ORDER — LORAZEPAM 2 MG/ML
0.5 INJECTION INTRAMUSCULAR EVERY 4 HOURS PRN
Status: CANCELLED | OUTPATIENT
Start: 2024-04-18

## 2024-04-10 RX ORDER — LORAZEPAM 2 MG/ML
0.5 INJECTION INTRAMUSCULAR EVERY 4 HOURS PRN
Status: CANCELLED | OUTPATIENT
Start: 2024-04-19

## 2024-04-10 RX ORDER — HEPARIN SODIUM,PORCINE 10 UNIT/ML
5-20 VIAL (ML) INTRAVENOUS DAILY PRN
Status: CANCELLED | OUTPATIENT
Start: 2024-04-18

## 2024-04-10 RX ORDER — ALBUTEROL SULFATE 90 UG/1
1-2 AEROSOL, METERED RESPIRATORY (INHALATION)
Status: CANCELLED
Start: 2024-04-17

## 2024-04-10 RX ORDER — METHOTREXATE 25 MG/ML
0.4 INJECTION, SOLUTION INTRA-ARTERIAL; INTRAMUSCULAR; INTRAVENOUS ONCE
Status: CANCELLED | OUTPATIENT
Start: 2024-04-18 | End: 2024-04-18

## 2024-04-10 RX ORDER — MEPERIDINE HYDROCHLORIDE 25 MG/ML
25 INJECTION INTRAMUSCULAR; INTRAVENOUS; SUBCUTANEOUS EVERY 30 MIN PRN
Status: CANCELLED | OUTPATIENT
Start: 2024-04-15

## 2024-04-10 RX ORDER — METHOTREXATE 25 MG/ML
0.4 INJECTION, SOLUTION INTRA-ARTERIAL; INTRAMUSCULAR; INTRAVENOUS ONCE
Status: CANCELLED | OUTPATIENT
Start: 2024-04-19 | End: 2024-04-19

## 2024-04-10 RX ORDER — ALBUTEROL SULFATE 90 UG/1
1-2 AEROSOL, METERED RESPIRATORY (INHALATION)
Status: CANCELLED
Start: 2024-04-15

## 2024-04-10 RX ORDER — HEPARIN SODIUM (PORCINE) LOCK FLUSH IV SOLN 100 UNIT/ML 100 UNIT/ML
5 SOLUTION INTRAVENOUS
Status: CANCELLED | OUTPATIENT
Start: 2024-04-18

## 2024-04-10 RX ORDER — METHOTREXATE 25 MG/ML
0.4 INJECTION, SOLUTION INTRA-ARTERIAL; INTRAMUSCULAR; INTRAVENOUS ONCE
Status: CANCELLED | OUTPATIENT
Start: 2024-04-15 | End: 2024-04-15

## 2024-04-10 RX ORDER — LORAZEPAM 2 MG/ML
0.5 INJECTION INTRAMUSCULAR EVERY 4 HOURS PRN
Status: CANCELLED | OUTPATIENT
Start: 2024-04-15

## 2024-04-10 RX ORDER — ALBUTEROL SULFATE 0.83 MG/ML
2.5 SOLUTION RESPIRATORY (INHALATION)
Status: CANCELLED | OUTPATIENT
Start: 2024-04-15

## 2024-04-10 RX ORDER — HEPARIN SODIUM (PORCINE) LOCK FLUSH IV SOLN 100 UNIT/ML 100 UNIT/ML
5 SOLUTION INTRAVENOUS
Status: CANCELLED | OUTPATIENT
Start: 2024-04-19

## 2024-04-10 NOTE — PROGRESS NOTES
Discussed with the patient the results of her imaging.  Again, reviewed that she meets criteria for GTN based on her HCG plateau for 4 values over 3 weeks.  I discussed that this is stage I disease with a WHO score of 2, making it low risk.  I reviewed options including a repeat D&C, hysterectomy, or methotrexate.  The patient would like to proceed with chemotherapy. We will plan to begin this on Monday.    I did also discuss the possibility of heterophilic antibodies causing a false elevation in her HCG.  The patient will take a home urine pregnancy test and let me know the results.  If this is negative, then I will have her come in for a more formal urine HCG test.     Carson Sommer MD

## 2024-04-12 ENCOUNTER — PATIENT OUTREACH (OUTPATIENT)
Dept: ONCOLOGY | Facility: CLINIC | Age: 44
End: 2024-04-12
Payer: COMMERCIAL

## 2024-04-12 NOTE — TELEPHONE ENCOUNTER
Call placed to patient to follow up on the Methotrexate patient education handout. Patient states she did review it. Will meet with patient in infusion to provide the chemotherapy education folder on 4/15/24.

## 2024-04-15 ENCOUNTER — LAB (OUTPATIENT)
Dept: INFUSION THERAPY | Facility: CLINIC | Age: 44
End: 2024-04-15
Attending: NURSE PRACTITIONER
Payer: COMMERCIAL

## 2024-04-15 ENCOUNTER — VIRTUAL VISIT (OUTPATIENT)
Dept: ONCOLOGY | Facility: CLINIC | Age: 44
End: 2024-04-15
Attending: NURSE PRACTITIONER
Payer: COMMERCIAL

## 2024-04-15 VITALS
WEIGHT: 156.1 LBS | RESPIRATION RATE: 16 BRPM | HEIGHT: 65 IN | DIASTOLIC BLOOD PRESSURE: 91 MMHG | TEMPERATURE: 97.9 F | OXYGEN SATURATION: 99 % | SYSTOLIC BLOOD PRESSURE: 136 MMHG | HEART RATE: 58 BPM | BODY MASS INDEX: 26.01 KG/M2

## 2024-04-15 VITALS — BODY MASS INDEX: 25.61 KG/M2 | HEIGHT: 64 IN | WEIGHT: 150 LBS

## 2024-04-15 DIAGNOSIS — O01.9 GESTATIONAL TROPHOBLASTIC NEOPLASM: Primary | ICD-10-CM

## 2024-04-15 LAB
ALBUMIN SERPL BCG-MCNC: 4.6 G/DL (ref 3.5–5.2)
ALP SERPL-CCNC: 61 U/L (ref 40–150)
ALT SERPL W P-5'-P-CCNC: 10 U/L (ref 0–50)
ANION GAP SERPL CALCULATED.3IONS-SCNC: 10 MMOL/L (ref 7–15)
AST SERPL W P-5'-P-CCNC: 16 U/L (ref 0–45)
BASOPHILS # BLD AUTO: 0 10E3/UL (ref 0–0.2)
BASOPHILS NFR BLD AUTO: 1 %
BILIRUB SERPL-MCNC: 0.3 MG/DL
BUN SERPL-MCNC: 12.2 MG/DL (ref 6–20)
CALCIUM SERPL-MCNC: 9.1 MG/DL (ref 8.6–10)
CHLORIDE SERPL-SCNC: 103 MMOL/L (ref 98–107)
CREAT SERPL-MCNC: 0.7 MG/DL (ref 0.51–0.95)
DEPRECATED HCO3 PLAS-SCNC: 26 MMOL/L (ref 22–29)
EGFRCR SERPLBLD CKD-EPI 2021: >90 ML/MIN/1.73M2
EOSINOPHIL # BLD AUTO: 0.1 10E3/UL (ref 0–0.7)
EOSINOPHIL NFR BLD AUTO: 1 %
ERYTHROCYTE [DISTWIDTH] IN BLOOD BY AUTOMATED COUNT: 13.2 % (ref 10–15)
GLUCOSE SERPL-MCNC: 104 MG/DL (ref 70–99)
HCT VFR BLD AUTO: 38.9 % (ref 35–47)
HGB BLD-MCNC: 12.6 G/DL (ref 11.7–15.7)
IMM GRANULOCYTES # BLD: 0 10E3/UL
IMM GRANULOCYTES NFR BLD: 0 %
LYMPHOCYTES # BLD AUTO: 1.6 10E3/UL (ref 0.8–5.3)
LYMPHOCYTES NFR BLD AUTO: 28 %
MCH RBC QN AUTO: 29.3 PG (ref 26.5–33)
MCHC RBC AUTO-ENTMCNC: 32.4 G/DL (ref 31.5–36.5)
MCV RBC AUTO: 91 FL (ref 78–100)
MONOCYTES # BLD AUTO: 0.4 10E3/UL (ref 0–1.3)
MONOCYTES NFR BLD AUTO: 6 %
NEUTROPHILS # BLD AUTO: 3.6 10E3/UL (ref 1.6–8.3)
NEUTROPHILS NFR BLD AUTO: 64 %
NRBC # BLD AUTO: 0 10E3/UL
NRBC BLD AUTO-RTO: 0 /100
PLATELET # BLD AUTO: 277 10E3/UL (ref 150–450)
POTASSIUM SERPL-SCNC: 3.9 MMOL/L (ref 3.4–5.3)
PROT SERPL-MCNC: 7.2 G/DL (ref 6.4–8.3)
RBC # BLD AUTO: 4.3 10E6/UL (ref 3.8–5.2)
SODIUM SERPL-SCNC: 139 MMOL/L (ref 135–145)
WBC # BLD AUTO: 5.6 10E3/UL (ref 4–11)

## 2024-04-15 PROCEDURE — 85025 COMPLETE CBC W/AUTO DIFF WBC: CPT | Performed by: OBSTETRICS & GYNECOLOGY

## 2024-04-15 PROCEDURE — 99207 PR NO CHARGE LOS: CPT

## 2024-04-15 PROCEDURE — 36415 COLL VENOUS BLD VENIPUNCTURE: CPT | Performed by: OBSTETRICS & GYNECOLOGY

## 2024-04-15 PROCEDURE — 96401 CHEMO ANTI-NEOPL SQ/IM: CPT

## 2024-04-15 PROCEDURE — 82247 BILIRUBIN TOTAL: CPT | Performed by: OBSTETRICS & GYNECOLOGY

## 2024-04-15 PROCEDURE — 82374 ASSAY BLOOD CARBON DIOXIDE: CPT | Performed by: OBSTETRICS & GYNECOLOGY

## 2024-04-15 PROCEDURE — 84702 CHORIONIC GONADOTROPIN TEST: CPT | Performed by: OBSTETRICS & GYNECOLOGY

## 2024-04-15 PROCEDURE — 99213 OFFICE O/P EST LOW 20 MIN: CPT | Mod: 95 | Performed by: NURSE PRACTITIONER

## 2024-04-15 PROCEDURE — 250N000011 HC RX IP 250 OP 636: Performed by: OBSTETRICS & GYNECOLOGY

## 2024-04-15 RX ORDER — METHOTREXATE 25 MG/ML
25 INJECTION, SOLUTION INTRA-ARTERIAL; INTRAMUSCULAR; INTRAVENOUS ONCE
Status: CANCELLED | OUTPATIENT
Start: 2024-04-18 | End: 2024-04-18

## 2024-04-15 RX ORDER — METHOTREXATE 25 MG/ML
25 INJECTION, SOLUTION INTRA-ARTERIAL; INTRAMUSCULAR; INTRAVENOUS ONCE
Status: CANCELLED | OUTPATIENT
Start: 2024-04-16 | End: 2024-04-16

## 2024-04-15 RX ORDER — METHOTREXATE 25 MG/ML
25 INJECTION, SOLUTION INTRA-ARTERIAL; INTRAMUSCULAR; INTRAVENOUS ONCE
Status: CANCELLED | OUTPATIENT
Start: 2024-04-19 | End: 2024-04-19

## 2024-04-15 RX ORDER — ONDANSETRON 4 MG/1
4-8 TABLET, FILM COATED ORAL EVERY 6 HOURS PRN
Qty: 30 TABLET | Refills: 1 | Status: SHIPPED | OUTPATIENT
Start: 2024-04-15 | End: 2024-07-01

## 2024-04-15 RX ORDER — METHOTREXATE 25 MG/ML
0.4 INJECTION, SOLUTION INTRA-ARTERIAL; INTRAMUSCULAR; INTRAVENOUS ONCE
Status: COMPLETED | OUTPATIENT
Start: 2024-04-15 | End: 2024-04-15

## 2024-04-15 RX ORDER — METHOTREXATE 25 MG/ML
25 INJECTION, SOLUTION INTRA-ARTERIAL; INTRAMUSCULAR; INTRAVENOUS ONCE
Status: CANCELLED | OUTPATIENT
Start: 2024-04-17 | End: 2024-04-17

## 2024-04-15 RX ADMIN — METHOTREXATE 27.5 MG: 25 INJECTION, SOLUTION INTRA-ARTERIAL; INTRAMUSCULAR; INTRAVENOUS at 13:41

## 2024-04-15 ASSESSMENT — PAIN SCALES - GENERAL
PAINLEVEL: NO PAIN (0)
PAINLEVEL: NO PAIN (0)

## 2024-04-15 NOTE — PROGRESS NOTES
Virtual Visit Details    Type of service:  Video Visit   Video Start Time:  1000  Video End Time: 1018    Originating Location (pt. Location): Home  Distant Location (provider location):  Off-site  Platform used for Video Visit: Well    Gynecologic Oncology Follow Up Note    RE: Verito Yee   : 1980  PRONOUNS: she/her/hers  JUDY: 04/15/2024  GYNECOLOGIC ONCOLOGIST: Carson Sommer MD    CC: Verito Yee is a 43 year old  female with low risk GTN presenting for disease management    INTERVAL HISTORY:  Verito presents virtually for methotrexate education and toxicity check prior to starting C1 methotrexate for low-risk GTN. She is taking her OCP as prescribed. Had one day of spotting at time of her initial consult with Dr. Sommer 24- this has resolved and has not recurred.    Denies any vaginal bleeding, abdominal pain, pelvic pain, headaches, changes in vision, or difficulty breathing.    Does not smoke. Very rare alcohol use (less than weekly). Sexually active with her male partner Teddy. Daughter Rhys is 9-10 months old.      ONCOLOGY HISTORY:     2/15/24 TVUS: nonviable fetal pole measuring 6w0d. Normal bilateral adnexa. The patient underwent expectant management.      2 weeks later she began having heavy bleeding and cramping.  This lasted 2 weeks.      : HCG 7180  :    3/11:   3/19:      3/21/24 TVUS: Retroverted uterus, endometrium with heterogenous material with vascularity. Normal bilateral ovaries, no free fluid.      3/22:      3/26: D&C              Path: Endometrium with gestational type changes and previous implantation site, no chorionic villi identified.      :     24: Pelvic US  UTERUS: 7.7 x 4.2 x 5.3 cm. Normal in size and position with no masses.     ENDOMETRIUM: 6 mm. No focal endometrial lesion can be seen. No endometrial region fluid collection  identified.    24:     24: CT CAP  "impression  IMPRESSION:  Unremarkable CT of the chest abdomen and pelvis without evidence of  neoplastic process    No past medical history on file.   Past Surgical History:   Procedure Laterality Date    DILATION AND CURETTAGE      TONSILLECTOMY       Social History     Socioeconomic History    Marital status: Single     Spouse name: None    Number of children: None    Years of education: None    Highest education level: None   Tobacco Use    Smoking status: Never     Passive exposure: Never    Smokeless tobacco: Never   Vaping Use    Vaping status: Never Used   Substance and Sexual Activity    Alcohol use: Yes     Comment: 6 drinks per week    Drug use: Never    Sexual activity: Yes     Partners: Male      Family History   Problem Relation Age of Onset    Heart Disease Father       Current Outpatient Medications   Medication Sig Dispense Refill    ondansetron (ZOFRAN) 4 MG tablet Take 1-2 tablets (4-8 mg) by mouth every 6 hours as needed for nausea 30 tablet 1    norethindrone-ethinyl estradiol (MICROGESTIN 1/20) 1-20 MG-MCG tablet Take 1 tablet by mouth daily 30 tablet 2     No current facility-administered medications for this visit.      No Known Allergies       OBJECTIVE:    PHYSICAL EXAM:  Ht 1.626 m (5' 4\")   Wt 68 kg (150 lb)   BMI 25.75 kg/m         No vitals taken- virtual visit  Constitutional: Alert and oriented non-toxic appearing female in no acute distress  HEENT: No periorbital edema or perioral cyanosis  Resp: Respirations unlabored, speaking in full sentences without apparent dyspnea, wheezing, or cough  Psych: Pleasant and interactive, affect euthymic, makes appropriate eye contact, answers questions appropriately, thought content logical      DATA:    Weight trend:  Wt Readings from Last 5 Encounters:   04/15/24 68 kg (150 lb)   04/08/24 69.2 kg (152 lb 9.6 oz)   12/22/21 63.5 kg (140 lb)   10/07/21 68.8 kg (151 lb 11.2 oz)        Labs:   Latest Reference Range & Units 04/15/24 12:37   Sodium " 135 - 145 mmol/L 139   Potassium 3.4 - 5.3 mmol/L 3.9   Chloride 98 - 107 mmol/L 103   Carbon Dioxide (CO2) 22 - 29 mmol/L 26   Urea Nitrogen 6.0 - 20.0 mg/dL 12.2   Creatinine 0.51 - 0.95 mg/dL 0.70   GFR Estimate >60 mL/min/1.73m2 >90   Calcium 8.6 - 10.0 mg/dL 9.1   Anion Gap 7 - 15 mmol/L 10   Albumin 3.5 - 5.2 g/dL 4.6   Protein Total 6.4 - 8.3 g/dL 7.2   Alkaline Phosphatase 40 - 150 U/L 61   ALT 0 - 50 U/L 10   AST 0 - 45 U/L 16   Bilirubin Total <=1.2 mg/dL 0.3   Glucose 70 - 99 mg/dL 104 (H)   WBC 4.0 - 11.0 10e3/uL 5.6   Hemoglobin 11.7 - 15.7 g/dL 12.6   Hematocrit 35.0 - 47.0 % 38.9   Platelet Count 150 - 450 10e3/uL 277   RBC Count 3.80 - 5.20 10e6/uL 4.30   MCV 78 - 100 fL 91   MCH 26.5 - 33.0 pg 29.3   MCHC 31.5 - 36.5 g/dL 32.4   RDW 10.0 - 15.0 % 13.2   % Neutrophils % 64   % Lymphocytes % 28   % Monocytes % 6   % Eosinophils % 1   % Basophils % 1   Absolute Basophils 0.0 - 0.2 10e3/uL 0.0   Absolute Eosinophils 0.0 - 0.7 10e3/uL 0.1   Absolute Immature Granulocytes <=0.4 10e3/uL 0.0   Absolute Lymphocytes 0.8 - 5.3 10e3/uL 1.6   Absolute Monocytes 0.0 - 1.3 10e3/uL 0.4   % Immature Granulocytes % 0   Absolute Neutrophils 1.6 - 8.3 10e3/uL 3.6   Absolute NRBCs 10e3/uL 0.0   NRBCs per 100 WBC <1 /100 0   (H): Data is abnormally high      Beta HCG from 4/15/24 pending           ASSESSMENT/PLAN:    1) 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 Morales's plan with methotrexate 0.4mg/kg (25mg max dose) IM days 1-5 on 14 day cycle with plan to treat for two additional cycles after beta hCG <5. Counseled on risks, side effects and management, and rationale behind treatment with methotrexate- given written information from [facility] as well. Reviewed importance of continuing OCP as prescribed, plan per Dr. Sommer is to continue this for 6 months after completion of therapy. No dose limiting toxicities, proceed with cycle 1 day 1 methotrexate as scheduled. Return to  clinic in two weeks with Malena Rasheed CNP for pre-methotrexate toxicity check, labs, and C2D1 methotrexate. Reviewed alarm signs and symptoms and when to seek further care.     2) Treatment/disease related effects:  None at this time, continue to monitor.  Script for ondansetron sent, may fill if she develops nausea    3) Genetics: Not indicated    4) Health maintenance issues discussed include to follow up with PCP for non-gynecologic concerns and co-morbid conditions    5) Patient verbalized understanding of and agreement with plan    MDM:  28 minutes spent on date of service on visit, including chart review, face to face visit, documentation, and coordination of care.    ARTIE Sinclair CNP (she/her)  Division of Gynecologic Oncology

## 2024-04-15 NOTE — PROGRESS NOTES
Infusion Nursing Note:  Verito Yee presents today for C1D1 Methotrexate.    Patient seen by provider today: Yes: VV with Malena Rasheed CNP   present during visit today: Not Applicable.    Note: This is the pts first time to [facility]. Orientation provided to infusion center, pt also instructed on how and when to use her call light.     Handout on Chemo Safety At Home printed out, reviewed with, and provided to the patient.    Triage RN phone number provided to the patient.     Questions answered to patient satisfaction.      Angela Raza RNCC met with patient while in infusion and reviewed chemo education with the patient.     Intravenous Access:  No Intravenous access/labs at this visit.    Treatment Conditions:  Lab Results   Component Value Date    HGB 12.6 04/15/2024    WBC 5.6 04/15/2024    ANEUTAUTO 3.6 04/15/2024     04/15/2024        Lab Results   Component Value Date     04/15/2024    POTASSIUM 3.9 04/15/2024    CR 0.70 04/15/2024    PER 9.1 04/15/2024    BILITOTAL 0.3 04/15/2024    ALBUMIN 4.6 04/15/2024    ALT 10 04/15/2024    AST 16 04/15/2024       Results reviewed, labs MET treatment parameters, ok to proceed with treatment.      Post Infusion Assessment:  Patient tolerated injection without incident.       Discharge Plan:   AVS to patient via MYCHART.  Patient will return 4/16/24 for next appointment. Future appts have been reviewed and crosschecked with appt note and plan.   Patient discharged in stable condition accompanied by: Significant other.  Departure Mode: Ambulatory.      Teresa Tolentino RN

## 2024-04-15 NOTE — LETTER
4/15/2024         RE: Verito Yee  [address] [place] [address]        Dear Colleague,    Thank you for referring your patient, Verito Yee, to the [facility]. Please see a copy of my visit note below.    Virtual Visit Details    Type of service:  Video Visit   Video Start Time:  1000  Video End Time: 1018    Originating Location (pt. Location): Home  Distant Location (provider location):  Off-site  Platform used for Video Visit: [facility]    Gynecologic Oncology Follow Up Note    RE: Verito Yee   : 1980  PRONOUNS: she/her/hers  JUDY: 04/15/2024  GYNECOLOGIC ONCOLOGIST: Carson Sommer MD    CC: Verito Yee is a 43 year old  female with low risk GTN presenting for disease management    INTERVAL HISTORY:  Verito presents virtually for methotrexate education and toxicity check prior to starting C1 methotrexate for low-risk GTN. She is taking her OCP as prescribed. Had one day of spotting at time of her initial consult with Dr. Sommer 24- this has resolved and has not recurred.    Denies any vaginal bleeding, abdominal pain, pelvic pain, headaches, changes in vision, or difficulty breathing.    Does not smoke. Very rare alcohol use (less than weekly). Sexually active with her male partner Teddy. Daughter Rhys is 9-10 months old.      ONCOLOGY HISTORY:     2/15/24 TVUS: nonviable fetal pole measuring 6w0d. Normal bilateral adnexa. The patient underwent expectant management.      2 weeks later she began having heavy bleeding and cramping.  This lasted 2 weeks.      : HCG 7180  :    3/11:   3/19:      3/21/24 TVUS: Retroverted uterus, endometrium with heterogenous material with vascularity. Normal bilateral ovaries, no free fluid.      3/22:      3/26: D&C              Path: Endometrium with gestational type changes and previous implantation site, no chorionic villi identified.      :     24: Pelvic  "US  UTERUS: 7.7 x 4.2 x 5.3 cm. Normal in size and position with no masses.     ENDOMETRIUM: 6 mm. No focal endometrial lesion can be seen. No endometrial region fluid collection  identified.    4/9/24:     4/9/24: CT CAP impression  IMPRESSION:  Unremarkable CT of the chest abdomen and pelvis without evidence of  neoplastic process    No past medical history on file.   Past Surgical History:   Procedure Laterality Date     DILATION AND CURETTAGE       TONSILLECTOMY       Social History     Socioeconomic History     Marital status: Single     Spouse name: None     Number of children: None     Years of education: None     Highest education level: None   Tobacco Use     Smoking status: Never     Passive exposure: Never     Smokeless tobacco: Never   Vaping Use     Vaping status: Never Used   Substance and Sexual Activity     Alcohol use: Yes     Comment: 6 drinks per week     Drug use: Never     Sexual activity: Yes     Partners: Male      Family History   Problem Relation Age of Onset     Heart Disease Father       Current Outpatient Medications   Medication Sig Dispense Refill     ondansetron (ZOFRAN) 4 MG tablet Take 1-2 tablets (4-8 mg) by mouth every 6 hours as needed for nausea 30 tablet 1     norethindrone-ethinyl estradiol (MICROGESTIN 1/20) 1-20 MG-MCG tablet Take 1 tablet by mouth daily 30 tablet 2     No current facility-administered medications for this visit.      No Known Allergies       OBJECTIVE:    PHYSICAL EXAM:  Ht 1.626 m (5' 4\")   Wt 68 kg (150 lb)   BMI 25.75 kg/m         No vitals taken- virtual visit  Constitutional: Alert and oriented non-toxic appearing female in no acute distress  HEENT: No periorbital edema or perioral cyanosis  Resp: Respirations unlabored, speaking in full sentences without apparent dyspnea, wheezing, or cough  Psych: Pleasant and interactive, affect euthymic, makes appropriate eye contact, answers questions appropriately, thought content " logical      DATA:    Weight trend:  Wt Readings from Last 5 Encounters:   04/15/24 68 kg (150 lb)   04/08/24 69.2 kg (152 lb 9.6 oz)   12/22/21 63.5 kg (140 lb)   10/07/21 68.8 kg (151 lb 11.2 oz)        Labs:   Latest Reference Range & Units 04/15/24 12:37   Sodium 135 - 145 mmol/L 139   Potassium 3.4 - 5.3 mmol/L 3.9   Chloride 98 - 107 mmol/L 103   Carbon Dioxide (CO2) 22 - 29 mmol/L 26   Urea Nitrogen 6.0 - 20.0 mg/dL 12.2   Creatinine 0.51 - 0.95 mg/dL 0.70   GFR Estimate >60 mL/min/1.73m2 >90   Calcium 8.6 - 10.0 mg/dL 9.1   Anion Gap 7 - 15 mmol/L 10   Albumin 3.5 - 5.2 g/dL 4.6   Protein Total 6.4 - 8.3 g/dL 7.2   Alkaline Phosphatase 40 - 150 U/L 61   ALT 0 - 50 U/L 10   AST 0 - 45 U/L 16   Bilirubin Total <=1.2 mg/dL 0.3   Glucose 70 - 99 mg/dL 104 (H)   WBC 4.0 - 11.0 10e3/uL 5.6   Hemoglobin 11.7 - 15.7 g/dL 12.6   Hematocrit 35.0 - 47.0 % 38.9   Platelet Count 150 - 450 10e3/uL 277   RBC Count 3.80 - 5.20 10e6/uL 4.30   MCV 78 - 100 fL 91   MCH 26.5 - 33.0 pg 29.3   MCHC 31.5 - 36.5 g/dL 32.4   RDW 10.0 - 15.0 % 13.2   % Neutrophils % 64   % Lymphocytes % 28   % Monocytes % 6   % Eosinophils % 1   % Basophils % 1   Absolute Basophils 0.0 - 0.2 10e3/uL 0.0   Absolute Eosinophils 0.0 - 0.7 10e3/uL 0.1   Absolute Immature Granulocytes <=0.4 10e3/uL 0.0   Absolute Lymphocytes 0.8 - 5.3 10e3/uL 1.6   Absolute Monocytes 0.0 - 1.3 10e3/uL 0.4   % Immature Granulocytes % 0   Absolute Neutrophils 1.6 - 8.3 10e3/uL 3.6   Absolute NRBCs 10e3/uL 0.0   NRBCs per 100 WBC <1 /100 0   (H): Data is abnormally high      Beta HCG from 4/15/24 pending           ASSESSMENT/PLAN:    1) 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 Morales's plan with methotrexate 0.4mg/kg (25mg max dose) IM days 1-5 on 14 day cycle with plan to treat for two additional cycles after beta hCG <5. Counseled on risks, side effects and management, and rationale behind treatment with methotrexate- given written  information from [facility] as well. Reviewed importance of continuing OCP as prescribed, plan per Dr. Sommer is to continue this for 6 months after completion of therapy. No dose limiting toxicities, proceed with cycle 1 day 1 methotrexate as scheduled. Return to clinic in two weeks with Malena Rasheed CNP for pre-methotrexate toxicity check, labs, and C2D1 methotrexate. Reviewed alarm signs and symptoms and when to seek further care.     2) Treatment/disease related effects:  None at this time, continue to monitor.  Script for ondansetron sent, may fill if she develops nausea    3) Genetics: Not indicated    4) Health maintenance issues discussed include to follow up with PCP for non-gynecologic concerns and co-morbid conditions    5) Patient verbalized understanding of and agreement with plan    MDM:  28 minutes spent on date of service on visit, including chart review, face to face visit, documentation, and coordination of care.    ARTIE Sinclair CNP (she/her)  Division of Gynecologic Oncology        Again, thank you for allowing me to participate in the care of your patient.        Sincerely,        ARTIE Sinclair CNP

## 2024-04-15 NOTE — PROGRESS NOTES
[facility]: Cancer Care Plan of Care Education Note                                    Discussion with Patient:                                                      Met with patient and significant other in infusion to provide education on Methotrexate.       Assessment:                                                      Assessment completed with:: Patient    Plan of Care Education   Yearly learning assessment completed?: Yes (see Education tab)  Diagnosis:: Gestational trophoblastic neoplasm  Does patient understand diagnosis?: Yes  Tx plan/regimen:: Methotrexate 25 mg IM daily for 5 days, 1 week off, repeat cycle  Does patient understand treatment plan/regimen?: Yes  Vascular access education provided for:: Other (comment) (IM injection)  Side effect education:: Lab value monitoring (anemia, neutropenia, thrombocytopenia);Mouth sores;Mylosuppression;Nausea/Vomiting;Diarrhea/Constipation;Hair loss;Fatigue  Safety/self care at home reviewed with patient:: Yes  Plan of Care:: HEATHER follow-up appointment;Lab appointment;MD follow-up appointment;Treatment schedule  When to call provider:: Uncontrolled nausea/vomiting;New/worsening pain;Shaking chills;Temperature >100.4F;Uncontrolled diarrhea/constipation    Evaluation of Learning  Patient Education Provided: Yes  Readiness:: Acceptance  Method:: Booklet/Handout;Literature;Explanation (Methotrexate handout from [facility] provided to patient)  Response:: Verbalizes understanding      Intervention/Education provided during outreach:                                                         Follow up call in 1-2 weeks  Patient to follow up as scheduled at next appt  Patient to call/Roostt message with updates  Confirmed patient has clinic and triage numbers    Signature:  Angela Raza RN

## 2024-04-15 NOTE — NURSING NOTE
Is the patient currently in the state of [place]? YES    Visit mode:VIDEO    If the visit is dropped, the patient can be reconnected by: VIDEO VISIT: Text to cell phone:   Telephone Information:   Mobile [phone]       Will anyone else be joining the visit? NO  (If patient encounters technical issues they should call [phone]    How would you like to obtain your AVS? MyChart    Are changes needed to the allergy or medication list? No    Are refills needed on medications prescribed by this physician? NO    Reason for visit: AZEEM JARAMILLO

## 2024-04-16 ENCOUNTER — INFUSION THERAPY VISIT (OUTPATIENT)
Dept: INFUSION THERAPY | Facility: CLINIC | Age: 44
End: 2024-04-16
Attending: NURSE PRACTITIONER
Payer: COMMERCIAL

## 2024-04-16 VITALS
DIASTOLIC BLOOD PRESSURE: 91 MMHG | TEMPERATURE: 98.1 F | HEART RATE: 85 BPM | RESPIRATION RATE: 16 BRPM | OXYGEN SATURATION: 96 % | SYSTOLIC BLOOD PRESSURE: 152 MMHG

## 2024-04-16 DIAGNOSIS — O01.9 GESTATIONAL TROPHOBLASTIC NEOPLASM: Primary | ICD-10-CM

## 2024-04-16 LAB — HCG-TM SERPL-ACNC: 233 IU/L

## 2024-04-16 PROCEDURE — 96401 CHEMO ANTI-NEOPL SQ/IM: CPT

## 2024-04-16 PROCEDURE — 99207 PR NO CHARGE LOS: CPT

## 2024-04-16 PROCEDURE — 250N000011 HC RX IP 250 OP 636: Performed by: NURSE PRACTITIONER

## 2024-04-16 RX ORDER — METHOTREXATE 25 MG/ML
25 INJECTION, SOLUTION INTRA-ARTERIAL; INTRAMUSCULAR; INTRAVENOUS ONCE
Status: COMPLETED | OUTPATIENT
Start: 2024-04-16 | End: 2024-04-16

## 2024-04-16 RX ADMIN — METHOTREXATE 25 MG: 25 INJECTION, SOLUTION INTRA-ARTERIAL; INTRAMUSCULAR; INTRAVENOUS at 16:19

## 2024-04-16 NOTE — PATIENT INSTRUCTIONS
Your visit today was with Malena Rasheed CNP for methotrexate education prior to C1 methotrexate    Plan:  Proceed with methotrexate injection today and as scheduled 4/16, 4/17, 4/18, 4/19  Return to clinic in 2 week(s) with Malena Rasheed CNP for  pre-methotrexate check prior to C2  Continue oral contraceptive  Nausea medication sent in- you can pick this up and start if you start having nausea    For urgent questions or concerns, please call rather than send a medical message.   M-F 8-4:30: [phone]  After hours, weekends, and holidays: [phone] and ask to speak to the gynecologic oncology resident on call

## 2024-04-17 ENCOUNTER — INFUSION THERAPY VISIT (OUTPATIENT)
Dept: INFUSION THERAPY | Facility: CLINIC | Age: 44
End: 2024-04-17
Attending: OBSTETRICS & GYNECOLOGY
Payer: COMMERCIAL

## 2024-04-17 VITALS
OXYGEN SATURATION: 99 % | HEART RATE: 89 BPM | RESPIRATION RATE: 16 BRPM | DIASTOLIC BLOOD PRESSURE: 87 MMHG | TEMPERATURE: 97.1 F | SYSTOLIC BLOOD PRESSURE: 134 MMHG

## 2024-04-17 DIAGNOSIS — O01.9 GESTATIONAL TROPHOBLASTIC NEOPLASM: Primary | ICD-10-CM

## 2024-04-17 PROCEDURE — 99207 PR NO CHARGE LOS: CPT

## 2024-04-17 PROCEDURE — 96401 CHEMO ANTI-NEOPL SQ/IM: CPT

## 2024-04-17 PROCEDURE — 250N000011 HC RX IP 250 OP 636: Performed by: NURSE PRACTITIONER

## 2024-04-17 RX ORDER — METHOTREXATE 25 MG/ML
25 INJECTION, SOLUTION INTRA-ARTERIAL; INTRAMUSCULAR; INTRAVENOUS ONCE
Status: COMPLETED | OUTPATIENT
Start: 2024-04-17 | End: 2024-04-17

## 2024-04-17 RX ADMIN — METHOTREXATE 25 MG: 25 INJECTION, SOLUTION INTRA-ARTERIAL; INTRAMUSCULAR; INTRAVENOUS at 16:04

## 2024-04-17 ASSESSMENT — PAIN SCALES - GENERAL: PAINLEVEL: NO PAIN (0)

## 2024-04-17 NOTE — PROGRESS NOTES
Infusion Nursing Note:  Verito Yee presents today for C1D3 Methotrexate.    Patient seen by provider today: No   present during visit today: Not Applicable.    Note: Patient reports that she had a headache while she was sleeping.  It went away on its own.  No nausea or mouth sores.    Intravenous Access:  No Intravenous access/labs at this visit.    Treatment Conditions:  Not Applicable.    Post Infusion Assessment:  Patient tolerated injection without incident.     Discharge Plan:   Patient will return 4/18/2024 for next appointment.   Future appts have been reviewed and crosschecked with appt note and plan.  Patient discharged in stable condition accompanied by: .  Departure Mode: Ambulatory.    Domitila Matos RN-BSN, PHN, OCN  [facility]

## 2024-04-18 ENCOUNTER — INFUSION THERAPY VISIT (OUTPATIENT)
Dept: INFUSION THERAPY | Facility: CLINIC | Age: 44
End: 2024-04-18
Attending: NURSE PRACTITIONER
Payer: COMMERCIAL

## 2024-04-18 VITALS
RESPIRATION RATE: 16 BRPM | OXYGEN SATURATION: 99 % | DIASTOLIC BLOOD PRESSURE: 83 MMHG | SYSTOLIC BLOOD PRESSURE: 127 MMHG | TEMPERATURE: 96.8 F | HEART RATE: 84 BPM

## 2024-04-18 DIAGNOSIS — O01.9 GESTATIONAL TROPHOBLASTIC NEOPLASM: Primary | ICD-10-CM

## 2024-04-18 PROCEDURE — 96401 CHEMO ANTI-NEOPL SQ/IM: CPT

## 2024-04-18 PROCEDURE — 250N000011 HC RX IP 250 OP 636: Performed by: NURSE PRACTITIONER

## 2024-04-18 PROCEDURE — 99207 PR NO CHARGE LOS: CPT

## 2024-04-18 RX ORDER — METHOTREXATE 25 MG/ML
25 INJECTION, SOLUTION INTRA-ARTERIAL; INTRAMUSCULAR; INTRAVENOUS ONCE
Status: COMPLETED | OUTPATIENT
Start: 2024-04-18 | End: 2024-04-18

## 2024-04-18 RX ADMIN — METHOTREXATE 25 MG: 25 INJECTION, SOLUTION INTRA-ARTERIAL; INTRAMUSCULAR; INTRAVENOUS at 16:19

## 2024-04-18 NOTE — PROGRESS NOTES
"Infusion Nursing Note:  Verito Yee presents today for C1D4 Methotrexate.    Patient seen by provider today: No   present during visit today: Not Applicable.    Note: Patient reports feeling generally \"not well today\". States she is unable to describe specifics but does deny fevers and mouth sores. She reports some nausea yesterday and lower energy level today.    Intravenous Access:  No Intravenous access/labs at this visit.    Treatment Conditions:  Not Applicable.    Post Infusion Assessment:  Patient tolerated injection without incident.     Discharge Plan:   Future appts have been reviewed and crosschecked with appt note and plan.  AVS to patient via [employer].  Patient will return 4/19/24 for next appointment.   Patient discharged in stable condition accompanied by: self.  Departure Mode: Ambulatory.      Janey Thomason RN BSN OCN                  "

## 2024-04-19 ENCOUNTER — INFUSION THERAPY VISIT (OUTPATIENT)
Dept: INFUSION THERAPY | Facility: CLINIC | Age: 44
End: 2024-04-19
Attending: NURSE PRACTITIONER
Payer: COMMERCIAL

## 2024-04-19 VITALS
TEMPERATURE: 97.6 F | SYSTOLIC BLOOD PRESSURE: 124 MMHG | RESPIRATION RATE: 16 BRPM | HEART RATE: 78 BPM | OXYGEN SATURATION: 98 % | DIASTOLIC BLOOD PRESSURE: 85 MMHG

## 2024-04-19 DIAGNOSIS — O01.9 GESTATIONAL TROPHOBLASTIC NEOPLASM: Primary | ICD-10-CM

## 2024-04-19 PROCEDURE — 99207 PR NO CHARGE LOS: CPT

## 2024-04-19 PROCEDURE — 250N000011 HC RX IP 250 OP 636: Performed by: NURSE PRACTITIONER

## 2024-04-19 PROCEDURE — 96401 CHEMO ANTI-NEOPL SQ/IM: CPT

## 2024-04-19 RX ORDER — METHOTREXATE 25 MG/ML
25 INJECTION, SOLUTION INTRA-ARTERIAL; INTRAMUSCULAR; INTRAVENOUS ONCE
Status: COMPLETED | OUTPATIENT
Start: 2024-04-19 | End: 2024-04-19

## 2024-04-19 RX ADMIN — METHOTREXATE 25 MG: 25 INJECTION, SOLUTION INTRA-ARTERIAL; INTRAMUSCULAR; INTRAVENOUS at 15:41

## 2024-04-19 ASSESSMENT — PAIN SCALES - GENERAL: PAINLEVEL: NO PAIN (0)

## 2024-04-19 NOTE — PROGRESS NOTES
Infusion Nursing Note:  Verito Yee presents today for C1D5 Methotrexate.    Patient seen by provider today: No   present during visit today: Not Applicable.    Note: The patient notes decreased fatigue as the week goes on. She denies any nausea today. She notes a decreased appetite but is able to eat okay.    Intravenous Access:  No Intravenous access/labs at this visit.    Treatment Conditions:  Not Applicable.      Post Infusion Assessment:  Patient tolerated injection without incident.       Discharge Plan:   AVS to patient via [employer].  Patient will return 4/29/24 for next appointment. Future appts have been reviewed and crosschecked with appt note and plan.   Patient discharged in stable condition accompanied by: Significant other.  Departure Mode: Ambulatory.      Teresa Tolentino RN

## 2024-04-26 ENCOUNTER — VIRTUAL VISIT (OUTPATIENT)
Dept: ONCOLOGY | Facility: CLINIC | Age: 44
End: 2024-04-26
Attending: NURSE PRACTITIONER
Payer: COMMERCIAL

## 2024-04-26 VITALS — BODY MASS INDEX: 25.61 KG/M2 | WEIGHT: 150 LBS | HEIGHT: 64 IN

## 2024-04-26 DIAGNOSIS — O01.9 GESTATIONAL TROPHOBLASTIC NEOPLASM: Primary | ICD-10-CM

## 2024-04-26 PROCEDURE — 99213 OFFICE O/P EST LOW 20 MIN: CPT | Mod: 95 | Performed by: NURSE PRACTITIONER

## 2024-04-26 RX ORDER — DIPHENHYDRAMINE HYDROCHLORIDE 50 MG/ML
50 INJECTION INTRAMUSCULAR; INTRAVENOUS
Status: CANCELLED
Start: 2024-04-29

## 2024-04-26 RX ORDER — METHYLPREDNISOLONE SODIUM SUCCINATE 125 MG/2ML
125 INJECTION, POWDER, LYOPHILIZED, FOR SOLUTION INTRAMUSCULAR; INTRAVENOUS
Status: CANCELLED
Start: 2024-05-03

## 2024-04-26 RX ORDER — METHYLPREDNISOLONE SODIUM SUCCINATE 125 MG/2ML
125 INJECTION, POWDER, LYOPHILIZED, FOR SOLUTION INTRAMUSCULAR; INTRAVENOUS
Status: CANCELLED
Start: 2024-05-02

## 2024-04-26 RX ORDER — MEPERIDINE HYDROCHLORIDE 25 MG/ML
25 INJECTION INTRAMUSCULAR; INTRAVENOUS; SUBCUTANEOUS EVERY 30 MIN PRN
Status: CANCELLED | OUTPATIENT
Start: 2024-05-01

## 2024-04-26 RX ORDER — LORAZEPAM 2 MG/ML
0.5 INJECTION INTRAMUSCULAR EVERY 4 HOURS PRN
Status: CANCELLED | OUTPATIENT
Start: 2024-04-29

## 2024-04-26 RX ORDER — DIPHENHYDRAMINE HYDROCHLORIDE 50 MG/ML
50 INJECTION INTRAMUSCULAR; INTRAVENOUS
Status: CANCELLED
Start: 2024-05-02

## 2024-04-26 RX ORDER — LORAZEPAM 2 MG/ML
0.5 INJECTION INTRAMUSCULAR EVERY 4 HOURS PRN
Status: CANCELLED | OUTPATIENT
Start: 2024-05-02

## 2024-04-26 RX ORDER — METHYLPREDNISOLONE SODIUM SUCCINATE 125 MG/2ML
125 INJECTION, POWDER, LYOPHILIZED, FOR SOLUTION INTRAMUSCULAR; INTRAVENOUS
Status: CANCELLED
Start: 2024-05-01

## 2024-04-26 RX ORDER — LORAZEPAM 2 MG/ML
0.5 INJECTION INTRAMUSCULAR EVERY 4 HOURS PRN
Status: CANCELLED | OUTPATIENT
Start: 2024-04-30

## 2024-04-26 RX ORDER — EPINEPHRINE 1 MG/ML
0.3 INJECTION, SOLUTION INTRAMUSCULAR; SUBCUTANEOUS EVERY 5 MIN PRN
Status: CANCELLED | OUTPATIENT
Start: 2024-05-02

## 2024-04-26 RX ORDER — METHYLPREDNISOLONE SODIUM SUCCINATE 125 MG/2ML
125 INJECTION, POWDER, LYOPHILIZED, FOR SOLUTION INTRAMUSCULAR; INTRAVENOUS
Status: CANCELLED
Start: 2024-04-29

## 2024-04-26 RX ORDER — ALBUTEROL SULFATE 90 UG/1
1-2 AEROSOL, METERED RESPIRATORY (INHALATION)
Status: CANCELLED
Start: 2024-04-29

## 2024-04-26 RX ORDER — METHYLPREDNISOLONE SODIUM SUCCINATE 125 MG/2ML
125 INJECTION, POWDER, LYOPHILIZED, FOR SOLUTION INTRAMUSCULAR; INTRAVENOUS
Status: CANCELLED
Start: 2024-04-30

## 2024-04-26 RX ORDER — DIPHENHYDRAMINE HYDROCHLORIDE 50 MG/ML
50 INJECTION INTRAMUSCULAR; INTRAVENOUS
Status: CANCELLED
Start: 2024-05-03

## 2024-04-26 RX ORDER — ALBUTEROL SULFATE 90 UG/1
1-2 AEROSOL, METERED RESPIRATORY (INHALATION)
Status: CANCELLED
Start: 2024-05-01

## 2024-04-26 RX ORDER — ALBUTEROL SULFATE 0.83 MG/ML
2.5 SOLUTION RESPIRATORY (INHALATION)
Status: CANCELLED | OUTPATIENT
Start: 2024-05-02

## 2024-04-26 RX ORDER — ALBUTEROL SULFATE 90 UG/1
1-2 AEROSOL, METERED RESPIRATORY (INHALATION)
Status: CANCELLED
Start: 2024-05-02

## 2024-04-26 RX ORDER — MEPERIDINE HYDROCHLORIDE 25 MG/ML
25 INJECTION INTRAMUSCULAR; INTRAVENOUS; SUBCUTANEOUS EVERY 30 MIN PRN
Status: CANCELLED | OUTPATIENT
Start: 2024-05-02

## 2024-04-26 RX ORDER — MEPERIDINE HYDROCHLORIDE 25 MG/ML
25 INJECTION INTRAMUSCULAR; INTRAVENOUS; SUBCUTANEOUS EVERY 30 MIN PRN
Status: CANCELLED | OUTPATIENT
Start: 2024-04-29

## 2024-04-26 RX ORDER — ALBUTEROL SULFATE 0.83 MG/ML
2.5 SOLUTION RESPIRATORY (INHALATION)
Status: CANCELLED | OUTPATIENT
Start: 2024-05-03

## 2024-04-26 RX ORDER — EPINEPHRINE 1 MG/ML
0.3 INJECTION, SOLUTION INTRAMUSCULAR; SUBCUTANEOUS EVERY 5 MIN PRN
Status: CANCELLED | OUTPATIENT
Start: 2024-04-29

## 2024-04-26 RX ORDER — MEPERIDINE HYDROCHLORIDE 25 MG/ML
25 INJECTION INTRAMUSCULAR; INTRAVENOUS; SUBCUTANEOUS EVERY 30 MIN PRN
Status: CANCELLED | OUTPATIENT
Start: 2024-04-30

## 2024-04-26 RX ORDER — METHOTREXATE 25 MG/ML
25 INJECTION, SOLUTION INTRA-ARTERIAL; INTRAMUSCULAR; INTRAVENOUS ONCE
Status: CANCELLED | OUTPATIENT
Start: 2024-05-01 | End: 2024-05-01

## 2024-04-26 RX ORDER — ALBUTEROL SULFATE 90 UG/1
1-2 AEROSOL, METERED RESPIRATORY (INHALATION)
Status: CANCELLED
Start: 2024-04-30

## 2024-04-26 RX ORDER — METHOTREXATE 25 MG/ML
25 INJECTION, SOLUTION INTRA-ARTERIAL; INTRAMUSCULAR; INTRAVENOUS ONCE
Status: CANCELLED | OUTPATIENT
Start: 2024-04-30 | End: 2024-04-30

## 2024-04-26 RX ORDER — ALBUTEROL SULFATE 0.83 MG/ML
2.5 SOLUTION RESPIRATORY (INHALATION)
Status: CANCELLED | OUTPATIENT
Start: 2024-04-30

## 2024-04-26 RX ORDER — METHOTREXATE 25 MG/ML
25 INJECTION, SOLUTION INTRA-ARTERIAL; INTRAMUSCULAR; INTRAVENOUS ONCE
Status: CANCELLED | OUTPATIENT
Start: 2024-04-29 | End: 2024-04-29

## 2024-04-26 RX ORDER — DIPHENHYDRAMINE HYDROCHLORIDE 50 MG/ML
50 INJECTION INTRAMUSCULAR; INTRAVENOUS
Status: CANCELLED
Start: 2024-04-30

## 2024-04-26 RX ORDER — LORAZEPAM 2 MG/ML
0.5 INJECTION INTRAMUSCULAR EVERY 4 HOURS PRN
Status: CANCELLED | OUTPATIENT
Start: 2024-05-01

## 2024-04-26 RX ORDER — METHOTREXATE 25 MG/ML
25 INJECTION, SOLUTION INTRA-ARTERIAL; INTRAMUSCULAR; INTRAVENOUS ONCE
Status: CANCELLED | OUTPATIENT
Start: 2024-05-03 | End: 2024-05-03

## 2024-04-26 RX ORDER — MEPERIDINE HYDROCHLORIDE 25 MG/ML
25 INJECTION INTRAMUSCULAR; INTRAVENOUS; SUBCUTANEOUS EVERY 30 MIN PRN
Status: CANCELLED | OUTPATIENT
Start: 2024-05-03

## 2024-04-26 RX ORDER — ALBUTEROL SULFATE 0.83 MG/ML
2.5 SOLUTION RESPIRATORY (INHALATION)
Status: CANCELLED | OUTPATIENT
Start: 2024-04-29

## 2024-04-26 RX ORDER — EPINEPHRINE 1 MG/ML
0.3 INJECTION, SOLUTION INTRAMUSCULAR; SUBCUTANEOUS EVERY 5 MIN PRN
Status: CANCELLED | OUTPATIENT
Start: 2024-04-30

## 2024-04-26 RX ORDER — METHOTREXATE 25 MG/ML
25 INJECTION, SOLUTION INTRA-ARTERIAL; INTRAMUSCULAR; INTRAVENOUS ONCE
Status: CANCELLED | OUTPATIENT
Start: 2024-05-02 | End: 2024-05-02

## 2024-04-26 RX ORDER — ALBUTEROL SULFATE 0.83 MG/ML
2.5 SOLUTION RESPIRATORY (INHALATION)
Status: CANCELLED | OUTPATIENT
Start: 2024-05-01

## 2024-04-26 RX ORDER — EPINEPHRINE 1 MG/ML
0.3 INJECTION, SOLUTION INTRAMUSCULAR; SUBCUTANEOUS EVERY 5 MIN PRN
Status: CANCELLED | OUTPATIENT
Start: 2024-05-03

## 2024-04-26 RX ORDER — EPINEPHRINE 1 MG/ML
0.3 INJECTION, SOLUTION INTRAMUSCULAR; SUBCUTANEOUS EVERY 5 MIN PRN
Status: CANCELLED | OUTPATIENT
Start: 2024-05-01

## 2024-04-26 RX ORDER — ALBUTEROL SULFATE 90 UG/1
1-2 AEROSOL, METERED RESPIRATORY (INHALATION)
Status: CANCELLED
Start: 2024-05-03

## 2024-04-26 RX ORDER — LORAZEPAM 2 MG/ML
0.5 INJECTION INTRAMUSCULAR EVERY 4 HOURS PRN
Status: CANCELLED | OUTPATIENT
Start: 2024-05-03

## 2024-04-26 RX ORDER — DIPHENHYDRAMINE HYDROCHLORIDE 50 MG/ML
50 INJECTION INTRAMUSCULAR; INTRAVENOUS
Status: CANCELLED
Start: 2024-05-01

## 2024-04-26 ASSESSMENT — PAIN SCALES - GENERAL: PAINLEVEL: NO PAIN (0)

## 2024-04-26 NOTE — LETTER
"    2024         RE: Verito Yee  [address] [facility] [address]        Dear Colleague,    Thank you for referring your patient, Verito Yee, to the [facility] CANCER [facility]. Please see a copy of my visit note below.    Virtual Visit Details    Type of service:  Video Visit   Video Start Time:  10:55 AM  Video End Time:11:10 AM    Originating Location (pt. Location): Home  Distant Location (provider location):  On-site  Platform used for Video Visit: [facility]      Gynecologic Oncology Follow Up Note    RE: Verito Yee   : 1980  PRONOUNS: she/her/hers  JUDY: 2024  GYNECOLOGIC ONCOLOGIST: Carson Sommer MD    CC: Verito Yee is a 43 year old  female with low risk GTN presenting for disease management    INTERVAL HISTORY:  Verito presents virtually for a toxicity check prior to C2 methotrexate for low-risk GTN.     She is taking her OCP daily as prescribed. Had some light vaginal bleeding this morning, not enough to fill a pad. Has four more active pills before the placebo pills.    Overall tolerated her first cycle without major concerns, but notes she felt \"crummy\" on days 6-7- was fatigued, felt lightheaded a couple of times while out at a [employer] game and [employer]. She now has a cold she picked up from her daughter and wonders if she was feeling ill due to this. Symptoms have improved.    Developed two small mouth sores, managed with salt and soda rinses. Able to eat and drink. Denies need need for further intervention.    Mild dyspepsia, no significant nausea- does not feel like eating much, but is hydrating well. No vomiting or diarrhea.    Denies fevers, chills, shortness of breath, chest pain, abdominal/pelvic pain, or urinary concerns.      ONCOLOGY HISTORY:     2/15/24 TVUS: nonviable fetal pole measuring 6w0d. Normal bilateral adnexa. The patient underwent expectant management.      2 weeks later she began having heavy bleeding and cramping.  This lasted 2 " weeks.      2/21: HCG 7180  2/28:    3/11:   3/19:      3/21/24 TVUS: Retroverted uterus, endometrium with heterogenous material with vascularity. Normal bilateral ovaries, no free fluid.      3/22:      3/26: D&C              Path: Endometrium with gestational type changes and previous implantation site, no chorionic villi identified.      4/2:     4/8/24: Pelvic US  UTERUS: 7.7 x 4.2 x 5.3 cm. Normal in size and position with no masses.     ENDOMETRIUM: 6 mm. No focal endometrial lesion can be seen. No endometrial region fluid collection  identified.    4/9/24:     4/9/24: CT CAP impression  IMPRESSION:  Unremarkable CT of the chest abdomen and pelvis without evidence of  neoplastic process    4/15/24: C1 methotrexate 25mg IM days 1-5, beta hCG 233  4/29/24: plan for C2 methotrexate 25mg IM days 1-5, beta hCG pending    No past medical history on file.   Past Surgical History:   Procedure Laterality Date     DILATION AND CURETTAGE       TONSILLECTOMY       Social History     Socioeconomic History     Marital status: Single     Spouse name: None     Number of children: None     Years of education: None     Highest education level: None   Tobacco Use     Smoking status: Never     Passive exposure: Never     Smokeless tobacco: Never   Vaping Use     Vaping status: Never Used   Substance and Sexual Activity     Alcohol use: Yes     Comment: 6 drinks per week     Drug use: Never     Sexual activity: Yes     Partners: Male      Family History   Problem Relation Age of Onset     Heart Disease Father       Current Outpatient Medications   Medication Sig Dispense Refill     norethindrone-ethinyl estradiol (MICROGESTIN 1/20) 1-20 MG-MCG tablet Take 1 tablet by mouth daily 30 tablet 2     ondansetron (ZOFRAN) 4 MG tablet Take 1-2 tablets (4-8 mg) by mouth every 6 hours as needed for nausea (Patient not taking: Reported on 4/15/2024) 30 tablet 1     No current facility-administered  medications for this visit.      No Known Allergies       OBJECTIVE:    PHYSICAL EXAM:      No vitals taken- virtual visit  Constitutional: Alert and oriented non-toxic appearing female in no acute distress  HEENT: No periorbital edema or perioral cyanosis  Resp: Respirations unlabored, speaking in full sentences without apparent dyspnea, wheezing, or cough  Psych: Pleasant and interactive, affect euthymic, makes appropriate eye contact, answers questions appropriately, thought content logical      DATA:    Weight trend:  Wt Readings from Last 5 Encounters:   04/15/24 70.8 kg (156 lb 1.6 oz)   04/15/24 68 kg (150 lb)   04/08/24 69.2 kg (152 lb 9.6 oz)   12/22/21 63.5 kg (140 lb)   10/07/21 68.8 kg (151 lb 11.2 oz)        Labs:  CBC, CMP, and beta hCG are pending        ASSESSMENT/PLAN:    1) 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 Sommer: methotrexate 0.4mg/kg (25mg max dose) IM days 1-5 on 14 day cycle with plan to treat for two additional cycles after beta hCG <5. Tolerated first cycle of methotrexate without dose limiting toxicities. Proceed with cycle two of methotrexate as planned on 4/29/24 pending results of labs, which will be reviewed and interpreted prior to therapy. Continue OCP daily. Return to clinic in two weeks with Dr. Sommer for pre-methotrexate toxicity check, labs, and C3D1 methotrexate. Reviewed alarm signs and symptoms and when to seek further care.     2) Treatment/disease related effects:  Vaginal bleeding: Light, began this morning- continue to monitor, checking hemoglobin on 4/29/24. Reviewed s/sxs excessive bleeding and when to seek care.  Stomatitis: Two small sores managed with salt and soda rinses, not impacting ability to eat or drink. Discussed topical agents such as Orajel or Magic Mouthwash- denies need for further intervention at this time.  Dyspepsia: Causing a decreased appetite- no vomiting. Can consider addition of famotidine 20mg 1-2 times daily  and/or ondansetron (has this prescribed). Reviewed importance of small frequent meals that are nutritionally dense and hydration.      Patient verbalized understanding of and agreement with plan    MDM:  25 minutes spent on date of service on visit, including chart review, face to face visit, documentation, and coordination of care.    ARTIE Sinclair, CNP (she/her)  Division of Gynecologic Oncology        Again, thank you for allowing me to participate in the care of your patient.        Sincerely,        ARTIE Sinclair CNP

## 2024-04-26 NOTE — PROGRESS NOTES
"Virtual Visit Details    Type of service:  Video Visit   Video Start Time:  10:55 AM  Video End Time:11:10 AM    Originating Location (pt. Location): Home  Distant Location (provider location):  On-site  Platform used for Video Visit: [facility]      Gynecologic Oncology Follow Up Note    RE: Verito Yee   : 1980  PRONOUNS: she/her/hers  JUDY: 2024  GYNECOLOGIC ONCOLOGIST: Carson Sommer MD    CC: Verito Yee is a 43 year old  female with low risk GTN presenting for disease management    INTERVAL HISTORY:  Verito presents virtually for a toxicity check prior to C2 methotrexate for low-risk GTN.     She is taking her OCP daily as prescribed. Had some light vaginal bleeding this morning, not enough to fill a pad. Has four more active pills before the placebo pills.    Overall tolerated her first cycle without major concerns, but notes she felt \"crummy\" on days 6-7- was fatigued, felt lightheaded a couple of times while out at a [employer] game and [employer]. She now has a cold she picked up from her daughter and wonders if she was feeling ill due to this. Symptoms have improved.    Developed two small mouth sores, managed with salt and soda rinses. Able to eat and drink. Denies need need for further intervention.    Mild dyspepsia, no significant nausea- does not feel like eating much, but is hydrating well. No vomiting or diarrhea.    Denies fevers, chills, shortness of breath, chest pain, abdominal/pelvic pain, or urinary concerns.      ONCOLOGY HISTORY:     2/15/24 TVUS: nonviable fetal pole measuring 6w0d. Normal bilateral adnexa. The patient underwent expectant management.      2 weeks later she began having heavy bleeding and cramping.  This lasted 2 weeks.      : HCG 7180  :    3/11:   3/19:      3/21/24 TVUS: Retroverted uterus, endometrium with heterogenous material with vascularity. Normal bilateral ovaries, no free fluid.      3/22:      3/26: D&C        "       Path: Endometrium with gestational type changes and previous implantation site, no chorionic villi identified.      4/2:     4/8/24: Pelvic US  UTERUS: 7.7 x 4.2 x 5.3 cm. Normal in size and position with no masses.     ENDOMETRIUM: 6 mm. No focal endometrial lesion can be seen. No endometrial region fluid collection  identified.    4/9/24:     4/9/24: CT CAP impression  IMPRESSION:  Unremarkable CT of the chest abdomen and pelvis without evidence of  neoplastic process    4/15/24: C1 methotrexate 25mg IM days 1-5, beta hCG 233  4/29/24: plan for C2 methotrexate 25mg IM days 1-5, beta hCG pending    No past medical history on file.   Past Surgical History:   Procedure Laterality Date    DILATION AND CURETTAGE      TONSILLECTOMY       Social History     Socioeconomic History    Marital status: Single     Spouse name: None    Number of children: None    Years of education: None    Highest education level: None   Tobacco Use    Smoking status: Never     Passive exposure: Never    Smokeless tobacco: Never   Vaping Use    Vaping status: Never Used   Substance and Sexual Activity    Alcohol use: Yes     Comment: 6 drinks per week    Drug use: Never    Sexual activity: Yes     Partners: Male      Family History   Problem Relation Age of Onset    Heart Disease Father       Current Outpatient Medications   Medication Sig Dispense Refill    norethindrone-ethinyl estradiol (MICROGESTIN 1/20) 1-20 MG-MCG tablet Take 1 tablet by mouth daily 30 tablet 2    ondansetron (ZOFRAN) 4 MG tablet Take 1-2 tablets (4-8 mg) by mouth every 6 hours as needed for nausea (Patient not taking: Reported on 4/15/2024) 30 tablet 1     No current facility-administered medications for this visit.      No Known Allergies       OBJECTIVE:    PHYSICAL EXAM:      No vitals taken- virtual visit  Constitutional: Alert and oriented non-toxic appearing female in no acute distress  HEENT: No periorbital edema or perioral cyanosis  Resp:  Respirations unlabored, speaking in full sentences without apparent dyspnea, wheezing, or cough  Psych: Pleasant and interactive, affect euthymic, makes appropriate eye contact, answers questions appropriately, thought content logical      DATA:    Weight trend:  Wt Readings from Last 5 Encounters:   04/15/24 70.8 kg (156 lb 1.6 oz)   04/15/24 68 kg (150 lb)   04/08/24 69.2 kg (152 lb 9.6 oz)   12/22/21 63.5 kg (140 lb)   10/07/21 68.8 kg (151 lb 11.2 oz)        Labs:  CBC, CMP, and beta hCG are pending        ASSESSMENT/PLAN:    1) 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 Sommer: methotrexate 0.4mg/kg (25mg max dose) IM days 1-5 on 14 day cycle with plan to treat for two additional cycles after beta hCG <5. Tolerated first cycle of methotrexate without dose limiting toxicities. Proceed with cycle two of methotrexate as planned on 4/29/24 pending results of labs, which will be reviewed and interpreted prior to therapy. Continue OCP daily. Return to clinic in two weeks with Dr. Sommer for pre-methotrexate toxicity check, labs, and C3D1 methotrexate. Reviewed alarm signs and symptoms and when to seek further care.     2) Treatment/disease related effects:  Vaginal bleeding: Light, began this morning- continue to monitor, checking hemoglobin on 4/29/24. Reviewed s/sxs excessive bleeding and when to seek care.  Stomatitis: Two small sores managed with salt and soda rinses, not impacting ability to eat or drink. Discussed topical agents such as Orajel or Magic Mouthwash- denies need for further intervention at this time.  Dyspepsia: Causing a decreased appetite- no vomiting. Can consider addition of famotidine 20mg 1-2 times daily and/or ondansetron (has this prescribed). Reviewed importance of small frequent meals that are nutritionally dense and hydration.      Patient verbalized understanding of and agreement with plan    MDM:  25 minutes spent on date of service on visit, including chart  review, face to face visit, documentation, and coordination of care.    ARTIE Sinclair, CNP (she/her)  Division of Gynecologic Oncology

## 2024-04-26 NOTE — PATIENT INSTRUCTIONS
Keep up with the salt and soda swishes- you can try Orajel if needed for pain but I can also prescribe you Magic Mouthwash (lidocaine, Benadryl, Maalox) if needed.  Small frequent meals to keep up nutrition and to help keep stomach upset at bay  Consider either your anti-nausea medication (ondansetron) OR famotidine (Pepcid) for the stomach upset  Lab and injection on 4/29/24, injections on 4/30, 5/1, 5/2, 5/3  Scheduled to see Dr. Sommer 5/13  Keep taking the birth control pill every day

## 2024-04-26 NOTE — NURSING NOTE
Is the patient currently in the state of [place]? YES    Visit mode:VIDEO    If the visit is dropped, the patient can be reconnected by: VIDEO VISIT: Text to cell phone:   Telephone Information:   Mobile [phone]       Will anyone else be joining the visit? NO  (If patient encounters technical issues they should call [phone])    How would you like to obtain your AVS? MyChart    Are changes needed to the allergy or medication list? Pt stated no changes to allergies and Pt stated no med changes    Are refills needed on medications prescribed by this physician? NO    Reason for visit: AZEEM JARAMILLO

## 2024-04-26 NOTE — LETTER
"    2024         RE: Verito Yee  [address] [facility] [address]        Dear Colleague,    Thank you for referring your patient, Verito Yee, to the [facility] CANCER [facility]. Please see a copy of my visit note below.    Virtual Visit Details    Type of service:  Video Visit   Video Start Time:  10:55 AM  Video End Time:11:10 AM    Originating Location (pt. Location): Home  Distant Location (provider location):  On-site  Platform used for Video Visit: [facility]      Gynecologic Oncology Follow Up Note    RE: Verito Yee   : 1980  PRONOUNS: she/her/hers  JUDY: 2024  GYNECOLOGIC ONCOLOGIST: Carson Sommer MD    CC: Verito Yee is a 43 year old  female with low risk GTN presenting for disease management    INTERVAL HISTORY:  Verito presents virtually for a toxicity check prior to C2 methotrexate for low-risk GTN.     She is taking her OCP daily as prescribed. Had some light vaginal bleeding this morning, not enough to fill a pad. Has four more active pills before the placebo pills.    Overall tolerated her first cycle without major concerns, but notes she felt \"crummy\" on days 6-7- was fatigued, felt lightheaded a couple of times while out at a [employer] game and [employer]. She now has a cold she picked up from her daughter and wonders if she was feeling ill due to this. Symptoms have improved.    Developed two small mouth sores, managed with salt and soda rinses. Able to eat and drink. Denies need need for further intervention.    Mild dyspepsia, no significant nausea- does not feel like eating much, but is hydrating well. No vomiting or diarrhea.    Denies fevers, chills, shortness of breath, chest pain, abdominal/pelvic pain, or urinary concerns.      ONCOLOGY HISTORY:     2/15/24 TVUS: nonviable fetal pole measuring 6w0d. Normal bilateral adnexa. The patient underwent expectant management.      2 weeks later she began having heavy bleeding and cramping.  This lasted 2 " weeks.      2/21: HCG 7180  2/28:    3/11:   3/19:      3/21/24 TVUS: Retroverted uterus, endometrium with heterogenous material with vascularity. Normal bilateral ovaries, no free fluid.      3/22:      3/26: D&C              Path: Endometrium with gestational type changes and previous implantation site, no chorionic villi identified.      4/2:     4/8/24: Pelvic US  UTERUS: 7.7 x 4.2 x 5.3 cm. Normal in size and position with no masses.     ENDOMETRIUM: 6 mm. No focal endometrial lesion can be seen. No endometrial region fluid collection  identified.    4/9/24:     4/9/24: CT CAP impression  IMPRESSION:  Unremarkable CT of the chest abdomen and pelvis without evidence of  neoplastic process    4/15/24: C1 methotrexate 25mg IM days 1-5, beta hCG 233  4/29/24: plan for C2 methotrexate 25mg IM days 1-5, beta hCG pending    No past medical history on file.   Past Surgical History:   Procedure Laterality Date     DILATION AND CURETTAGE       TONSILLECTOMY       Social History     Socioeconomic History     Marital status: Single     Spouse name: None     Number of children: None     Years of education: None     Highest education level: None   Tobacco Use     Smoking status: Never     Passive exposure: Never     Smokeless tobacco: Never   Vaping Use     Vaping status: Never Used   Substance and Sexual Activity     Alcohol use: Yes     Comment: 6 drinks per week     Drug use: Never     Sexual activity: Yes     Partners: Male      Family History   Problem Relation Age of Onset     Heart Disease Father       Current Outpatient Medications   Medication Sig Dispense Refill     norethindrone-ethinyl estradiol (MICROGESTIN 1/20) 1-20 MG-MCG tablet Take 1 tablet by mouth daily 30 tablet 2     ondansetron (ZOFRAN) 4 MG tablet Take 1-2 tablets (4-8 mg) by mouth every 6 hours as needed for nausea (Patient not taking: Reported on 4/15/2024) 30 tablet 1     No current facility-administered  medications for this visit.      No Known Allergies       OBJECTIVE:    PHYSICAL EXAM:      No vitals taken- virtual visit  Constitutional: Alert and oriented non-toxic appearing female in no acute distress  HEENT: No periorbital edema or perioral cyanosis  Resp: Respirations unlabored, speaking in full sentences without apparent dyspnea, wheezing, or cough  Psych: Pleasant and interactive, affect euthymic, makes appropriate eye contact, answers questions appropriately, thought content logical      DATA:    Weight trend:  Wt Readings from Last 5 Encounters:   04/15/24 70.8 kg (156 lb 1.6 oz)   04/15/24 68 kg (150 lb)   04/08/24 69.2 kg (152 lb 9.6 oz)   12/22/21 63.5 kg (140 lb)   10/07/21 68.8 kg (151 lb 11.2 oz)        Labs:  CBC, CMP, and beta hCG are pending        ASSESSMENT/PLAN:    1) 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 Sommer: methotrexate 0.4mg/kg (25mg max dose) IM days 1-5 on 14 day cycle with plan to treat for two additional cycles after beta hCG <5. Tolerated first cycle of methotrexate without dose limiting toxicities. Proceed with cycle two of methotrexate as planned on 4/29/24 pending results of labs, which will be reviewed and interpreted prior to therapy. Continue OCP daily. Return to clinic in two weeks with Dr. Sommer for pre-methotrexate toxicity check, labs, and C3D1 methotrexate. Reviewed alarm signs and symptoms and when to seek further care.     2) Treatment/disease related effects:  Vaginal bleeding: Light, began this morning- continue to monitor, checking hemoglobin on 4/29/24. Reviewed s/sxs excessive bleeding and when to seek care.  Stomatitis: Two small sores managed with salt and soda rinses, not impacting ability to eat or drink. Discussed topical agents such as Orajel or Magic Mouthwash- denies need for further intervention at this time.  Dyspepsia: Causing a decreased appetite- no vomiting. Can consider addition of famotidine 20mg 1-2 times daily  and/or ondansetron (has this prescribed). Reviewed importance of small frequent meals that are nutritionally dense and hydration.      Patient verbalized understanding of and agreement with plan    MDM:  25 minutes spent on date of service on visit, including chart review, face to face visit, documentation, and coordination of care.    ARTIE Sinclair, CNP (she/her)  Division of Gynecologic Oncology        Again, thank you for allowing me to participate in the care of your patient.        Sincerely,        ARTIE Sinclair CNP

## 2024-04-29 ENCOUNTER — LAB (OUTPATIENT)
Dept: INFUSION THERAPY | Facility: CLINIC | Age: 44
End: 2024-04-29
Attending: NURSE PRACTITIONER
Payer: COMMERCIAL

## 2024-04-29 VITALS
DIASTOLIC BLOOD PRESSURE: 86 MMHG | HEART RATE: 77 BPM | WEIGHT: 155.5 LBS | TEMPERATURE: 98.4 F | BODY MASS INDEX: 26.69 KG/M2 | SYSTOLIC BLOOD PRESSURE: 127 MMHG | RESPIRATION RATE: 16 BRPM | OXYGEN SATURATION: 97 %

## 2024-04-29 DIAGNOSIS — O01.9 GESTATIONAL TROPHOBLASTIC NEOPLASM: Primary | ICD-10-CM

## 2024-04-29 LAB
ALBUMIN SERPL BCG-MCNC: 4.3 G/DL (ref 3.5–5.2)
ALP SERPL-CCNC: 54 U/L (ref 40–150)
ALT SERPL W P-5'-P-CCNC: 13 U/L (ref 0–50)
ANION GAP SERPL CALCULATED.3IONS-SCNC: 10 MMOL/L (ref 7–15)
AST SERPL W P-5'-P-CCNC: 19 U/L (ref 0–45)
BASOPHILS # BLD AUTO: 0 10E3/UL (ref 0–0.2)
BASOPHILS NFR BLD AUTO: 1 %
BILIRUB SERPL-MCNC: 0.2 MG/DL
BUN SERPL-MCNC: 11.6 MG/DL (ref 6–20)
CALCIUM SERPL-MCNC: 9 MG/DL (ref 8.6–10)
CHLORIDE SERPL-SCNC: 105 MMOL/L (ref 98–107)
CREAT SERPL-MCNC: 0.69 MG/DL (ref 0.51–0.95)
DEPRECATED HCO3 PLAS-SCNC: 23 MMOL/L (ref 22–29)
EGFRCR SERPLBLD CKD-EPI 2021: >90 ML/MIN/1.73M2
EOSINOPHIL # BLD AUTO: 0.1 10E3/UL (ref 0–0.7)
EOSINOPHIL NFR BLD AUTO: 1 %
ERYTHROCYTE [DISTWIDTH] IN BLOOD BY AUTOMATED COUNT: 13 % (ref 10–15)
GLUCOSE SERPL-MCNC: 94 MG/DL (ref 70–99)
HCT VFR BLD AUTO: 37.8 % (ref 35–47)
HGB BLD-MCNC: 12.4 G/DL (ref 11.7–15.7)
IMM GRANULOCYTES # BLD: 0 10E3/UL
IMM GRANULOCYTES NFR BLD: 0 %
LYMPHOCYTES # BLD AUTO: 1.5 10E3/UL (ref 0.8–5.3)
LYMPHOCYTES NFR BLD AUTO: 31 %
MCH RBC QN AUTO: 29.1 PG (ref 26.5–33)
MCHC RBC AUTO-ENTMCNC: 32.8 G/DL (ref 31.5–36.5)
MCV RBC AUTO: 89 FL (ref 78–100)
MONOCYTES # BLD AUTO: 0.3 10E3/UL (ref 0–1.3)
MONOCYTES NFR BLD AUTO: 7 %
NEUTROPHILS # BLD AUTO: 2.8 10E3/UL (ref 1.6–8.3)
NEUTROPHILS NFR BLD AUTO: 60 %
NRBC # BLD AUTO: 0 10E3/UL
NRBC BLD AUTO-RTO: 0 /100
PLATELET # BLD AUTO: 287 10E3/UL (ref 150–450)
POTASSIUM SERPL-SCNC: 4.7 MMOL/L (ref 3.4–5.3)
PROT SERPL-MCNC: 7.2 G/DL (ref 6.4–8.3)
RBC # BLD AUTO: 4.26 10E6/UL (ref 3.8–5.2)
SODIUM SERPL-SCNC: 138 MMOL/L (ref 135–145)
WBC # BLD AUTO: 4.8 10E3/UL (ref 4–11)

## 2024-04-29 PROCEDURE — 99207 PR NO CHARGE LOS: CPT

## 2024-04-29 PROCEDURE — 250N000011 HC RX IP 250 OP 636: Performed by: NURSE PRACTITIONER

## 2024-04-29 PROCEDURE — 36415 COLL VENOUS BLD VENIPUNCTURE: CPT | Performed by: NURSE PRACTITIONER

## 2024-04-29 PROCEDURE — 84702 CHORIONIC GONADOTROPIN TEST: CPT | Performed by: NURSE PRACTITIONER

## 2024-04-29 PROCEDURE — 96401 CHEMO ANTI-NEOPL SQ/IM: CPT

## 2024-04-29 PROCEDURE — 80053 COMPREHEN METABOLIC PANEL: CPT | Performed by: NURSE PRACTITIONER

## 2024-04-29 PROCEDURE — 85025 COMPLETE CBC W/AUTO DIFF WBC: CPT | Performed by: NURSE PRACTITIONER

## 2024-04-29 RX ORDER — METHOTREXATE 25 MG/ML
25 INJECTION, SOLUTION INTRA-ARTERIAL; INTRAMUSCULAR; INTRAVENOUS ONCE
Status: COMPLETED | OUTPATIENT
Start: 2024-04-29 | End: 2024-04-29

## 2024-04-29 RX ADMIN — METHOTREXATE 25 MG: 25 SOLUTION INTRA-ARTERIAL; INTRAMUSCULAR; INTRATHECAL; INTRAVENOUS at 08:47

## 2024-04-29 ASSESSMENT — PAIN SCALES - GENERAL: PAINLEVEL: NO PAIN (0)

## 2024-04-29 NOTE — PROGRESS NOTES
Infusion Nursing Note:  Verito Yee presents today for C2D1 Methotrexate.    Patient seen by provider today: No   present during visit today: Not Applicable.    Note: The patient reports she is feeling at her baseline and denies concerns at this time.    She notes two small mouths sores with her first cycle of treatment but states they are now resolved after a couple salt water rinses. Patient encouraged to proactively salt water rinse a 2-3 x per day to help prevent mouth sores with this cycle.    Intravenous Access:  No Intravenous access/labs at this visit.    Treatment Conditions:  Lab Results   Component Value Date    HGB 12.4 04/29/2024    WBC 4.8 04/29/2024    ANEUTAUTO 2.8 04/29/2024     04/29/2024        Lab Results   Component Value Date     04/29/2024    POTASSIUM 4.7 04/29/2024    CR 0.69 04/29/2024    PER 9.0 04/29/2024    BILITOTAL 0.2 04/29/2024    ALBUMIN 4.3 04/29/2024    ALT 13 04/29/2024    AST 19 04/29/2024       Results reviewed, labs MET treatment parameters, ok to proceed with treatment.      Post Infusion Assessment:  Patient tolerated injection without incident.     Discharge Plan:   AVS to patient via MYCHART.  Patient will return 4/30/24 for next appointment. Future appts have been reviewed and crosschecked with appt note and plan.   Patient discharged in stable condition accompanied by: osiris.  Departure Mode: Ambulatory.      Teresa Tolentino RN

## 2024-04-30 ENCOUNTER — INFUSION THERAPY VISIT (OUTPATIENT)
Dept: INFUSION THERAPY | Facility: CLINIC | Age: 44
End: 2024-04-30
Attending: NURSE PRACTITIONER
Payer: COMMERCIAL

## 2024-04-30 VITALS
HEART RATE: 79 BPM | OXYGEN SATURATION: 98 % | DIASTOLIC BLOOD PRESSURE: 74 MMHG | TEMPERATURE: 97.2 F | SYSTOLIC BLOOD PRESSURE: 123 MMHG | RESPIRATION RATE: 16 BRPM

## 2024-04-30 DIAGNOSIS — O01.9 GESTATIONAL TROPHOBLASTIC NEOPLASM: Primary | ICD-10-CM

## 2024-04-30 LAB — HCG-TM SERPL-ACNC: 5 IU/L

## 2024-04-30 PROCEDURE — 99207 PR NO CHARGE LOS: CPT

## 2024-04-30 PROCEDURE — 250N000011 HC RX IP 250 OP 636: Performed by: NURSE PRACTITIONER

## 2024-04-30 PROCEDURE — 96401 CHEMO ANTI-NEOPL SQ/IM: CPT

## 2024-04-30 RX ORDER — METHOTREXATE 25 MG/ML
25 INJECTION, SOLUTION INTRA-ARTERIAL; INTRAMUSCULAR; INTRAVENOUS ONCE
Status: COMPLETED | OUTPATIENT
Start: 2024-04-30 | End: 2024-04-30

## 2024-04-30 RX ADMIN — METHOTREXATE 25 MG: 25 SOLUTION INTRA-ARTERIAL; INTRAMUSCULAR; INTRATHECAL; INTRAVENOUS at 08:56

## 2024-04-30 NOTE — PROGRESS NOTES
Infusion Nursing Note:  Verito Yee presents today for C2D2 Methotrexate inj.    Patient seen by provider today: No   present during visit today: Not Applicable.    Note: Patient has been tolerating injections.    Intravenous Access:  No Intravenous access/labs at this visit.    Treatment Conditions:  Lab Results   Component Value Date    HGB 12.4 04/29/2024    WBC 4.8 04/29/2024    ANEUTAUTO 2.8 04/29/2024     04/29/2024        Lab Results   Component Value Date     04/29/2024    POTASSIUM 4.7 04/29/2024    CR 0.69 04/29/2024    PER 9.0 04/29/2024    BILITOTAL 0.2 04/29/2024    ALBUMIN 4.3 04/29/2024    ALT 13 04/29/2024    AST 19 04/29/2024         Post Infusion Assessment:  Patient tolerated injection without incident.       Discharge Plan:   AVS to patient via MYCHART.  Patient will return 5/1 for next appointment.   Patient discharged in stable condition accompanied by: Significant other.  Departure Mode: Ambulatory.      Joelle Jain RN

## 2024-05-01 ENCOUNTER — INFUSION THERAPY VISIT (OUTPATIENT)
Dept: INFUSION THERAPY | Facility: CLINIC | Age: 44
End: 2024-05-01
Attending: NURSE PRACTITIONER
Payer: COMMERCIAL

## 2024-05-01 VITALS
SYSTOLIC BLOOD PRESSURE: 123 MMHG | HEART RATE: 77 BPM | DIASTOLIC BLOOD PRESSURE: 76 MMHG | TEMPERATURE: 97.4 F | OXYGEN SATURATION: 99 %

## 2024-05-01 DIAGNOSIS — O01.9 GESTATIONAL TROPHOBLASTIC NEOPLASM: Primary | ICD-10-CM

## 2024-05-01 PROCEDURE — 99207 PR NO CHARGE LOS: CPT

## 2024-05-01 PROCEDURE — 96401 CHEMO ANTI-NEOPL SQ/IM: CPT

## 2024-05-01 PROCEDURE — 250N000011 HC RX IP 250 OP 636: Performed by: NURSE PRACTITIONER

## 2024-05-01 RX ORDER — METHOTREXATE 25 MG/ML
25 INJECTION, SOLUTION INTRA-ARTERIAL; INTRAMUSCULAR; INTRAVENOUS ONCE
Status: COMPLETED | OUTPATIENT
Start: 2024-05-01 | End: 2024-05-01

## 2024-05-01 RX ADMIN — METHOTREXATE 25 MG: 25 SOLUTION INTRA-ARTERIAL; INTRAMUSCULAR; INTRATHECAL; INTRAVENOUS at 09:48

## 2024-05-01 ASSESSMENT — PAIN SCALES - GENERAL: PAINLEVEL: NO PAIN (0)

## 2024-05-01 NOTE — PROGRESS NOTES
Infusion Nursing Note:  Verito Yee presents today for C2D3 Methotrexate IM.    Patient seen by provider today: No   present during visit today: Not Applicable.    Note: Pt admits to tolerating injections without complications, denies any medical complaints today. See flow sheet for assessment.      Intravenous Access:  No Intravenous access/labs at this visit.    Treatment Conditions:  Not Applicable.      Post Infusion Assessment:  Patient tolerated injection without incident.  Site patent and intact, free from redness, edema or discomfort.       Discharge Plan:   Patient discharged in stable condition accompanied by: friend.  Departure Mode: Ambulatory.  Pt will RTC .C2D4 Methotrexate. Appts verified and pt aware.      Ajay Mcallister RN

## 2024-05-02 ENCOUNTER — INFUSION THERAPY VISIT (OUTPATIENT)
Dept: INFUSION THERAPY | Facility: CLINIC | Age: 44
End: 2024-05-02
Attending: NURSE PRACTITIONER
Payer: COMMERCIAL

## 2024-05-02 VITALS
DIASTOLIC BLOOD PRESSURE: 75 MMHG | HEART RATE: 87 BPM | RESPIRATION RATE: 16 BRPM | TEMPERATURE: 97.7 F | OXYGEN SATURATION: 98 % | SYSTOLIC BLOOD PRESSURE: 111 MMHG

## 2024-05-02 DIAGNOSIS — O01.9 GESTATIONAL TROPHOBLASTIC NEOPLASM: Primary | ICD-10-CM

## 2024-05-02 PROCEDURE — 250N000011 HC RX IP 250 OP 636: Performed by: NURSE PRACTITIONER

## 2024-05-02 PROCEDURE — 99207 PR NO CHARGE LOS: CPT

## 2024-05-02 PROCEDURE — 96401 CHEMO ANTI-NEOPL SQ/IM: CPT

## 2024-05-02 RX ORDER — METHOTREXATE 25 MG/ML
25 INJECTION, SOLUTION INTRA-ARTERIAL; INTRAMUSCULAR; INTRAVENOUS ONCE
Status: COMPLETED | OUTPATIENT
Start: 2024-05-02 | End: 2024-05-02

## 2024-05-02 RX ADMIN — METHOTREXATE 25 MG: 25 SOLUTION INTRA-ARTERIAL; INTRAMUSCULAR; INTRATHECAL; INTRAVENOUS at 09:56

## 2024-05-02 NOTE — PROGRESS NOTES
Infusion Nursing Note:  Verito Yee presents today for C2 D4 Methotrexate IM.    Patient seen by provider today: No   present during visit today: Not Applicable.    Note: Patient offers minimal concerns today. She comes in with a headache but states this happens when she eats a big meal, which she did accidentally prior to coming in. Denies nausea, but endorses some continued fatigue.      Intravenous Access:  No Intravenous access/labs at this visit.    Treatment Conditions:  Not Applicable.      Post Infusion Assessment:  Patient tolerated injection without incident.  Site patent and intact, free from redness, edema or discomfort.       Discharge Plan:   Discharge instructions reviewed with: Patient.  Patient and/or family verbalized understanding of discharge instructions and all questions answered.  Patient discharged in stable condition accompanied by: self and significant other .  Departure Mode: Ambulatory.      Annalise Stahl RN

## 2024-05-03 ENCOUNTER — INFUSION THERAPY VISIT (OUTPATIENT)
Dept: INFUSION THERAPY | Facility: CLINIC | Age: 44
End: 2024-05-03
Attending: NURSE PRACTITIONER
Payer: COMMERCIAL

## 2024-05-03 VITALS
SYSTOLIC BLOOD PRESSURE: 137 MMHG | OXYGEN SATURATION: 96 % | HEART RATE: 77 BPM | RESPIRATION RATE: 16 BRPM | TEMPERATURE: 98.4 F | DIASTOLIC BLOOD PRESSURE: 87 MMHG

## 2024-05-03 DIAGNOSIS — O01.9 GESTATIONAL TROPHOBLASTIC NEOPLASM: Primary | ICD-10-CM

## 2024-05-03 PROCEDURE — 250N000011 HC RX IP 250 OP 636: Performed by: NURSE PRACTITIONER

## 2024-05-03 PROCEDURE — 99207 PR NO CHARGE LOS: CPT

## 2024-05-03 PROCEDURE — 96401 CHEMO ANTI-NEOPL SQ/IM: CPT

## 2024-05-03 RX ORDER — METHOTREXATE 25 MG/ML
25 INJECTION, SOLUTION INTRA-ARTERIAL; INTRAMUSCULAR; INTRAVENOUS ONCE
Status: COMPLETED | OUTPATIENT
Start: 2024-05-03 | End: 2024-05-03

## 2024-05-03 RX ADMIN — METHOTREXATE 25 MG: 25 SOLUTION INTRA-ARTERIAL; INTRAMUSCULAR; INTRATHECAL; INTRAVENOUS at 09:09

## 2024-05-03 NOTE — PROGRESS NOTES
Infusion Nursing Note:  Verito Yee presents today for C2D5 Methotrexate IM.    Patient seen by provider today: No   present during visit today: Not Applicable.    Note: Patient reports that she had increased bleeding overnight where she was changing her tampon every 30-60 minutes. Patient reports she is on the placebo week of her birth control and believes it is related to this. She notes that the bleeding has since resolved. Reviewed symptoms to monitor for, dizziness/lightheadedness, shortness of breath, palpitations, ongoing bleeding, etc. Patient verbalized understanding. See flowsheets for full assessment.    Intravenous Access:  No Intravenous access/labs at this visit.    Treatment Conditions:  Not Applicable.    Post Infusion Assessment:  Patient tolerated injection without incident.     Discharge Plan:   Future appts have been reviewed and crosschecked with appt note and plan.  AVS to patient via [employer].  Patient will return 5/13/24 for next appointment.   Patient discharged in stable condition accompanied by: .  Departure Mode: Ambulatory.      Janey Thomason RN BSN OCN

## 2024-05-13 ENCOUNTER — LAB (OUTPATIENT)
Dept: INFUSION THERAPY | Facility: CLINIC | Age: 44
End: 2024-05-13
Attending: NURSE PRACTITIONER
Payer: COMMERCIAL

## 2024-05-13 ENCOUNTER — ONCOLOGY VISIT (OUTPATIENT)
Dept: ONCOLOGY | Facility: CLINIC | Age: 44
End: 2024-05-13
Attending: OBSTETRICS & GYNECOLOGY
Payer: COMMERCIAL

## 2024-05-13 VITALS
OXYGEN SATURATION: 100 % | HEART RATE: 81 BPM | BODY MASS INDEX: 25.61 KG/M2 | DIASTOLIC BLOOD PRESSURE: 86 MMHG | HEIGHT: 64 IN | SYSTOLIC BLOOD PRESSURE: 126 MMHG | WEIGHT: 150 LBS

## 2024-05-13 DIAGNOSIS — O01.9 GESTATIONAL TROPHOBLASTIC NEOPLASM: Primary | ICD-10-CM

## 2024-05-13 LAB
ALBUMIN SERPL BCG-MCNC: 4.4 G/DL (ref 3.5–5.2)
ALP SERPL-CCNC: 59 U/L (ref 40–150)
ALT SERPL W P-5'-P-CCNC: 7 U/L (ref 0–50)
ANION GAP SERPL CALCULATED.3IONS-SCNC: 9 MMOL/L (ref 7–15)
AST SERPL W P-5'-P-CCNC: 20 U/L (ref 0–45)
BASOPHILS # BLD AUTO: 0 10E3/UL (ref 0–0.2)
BASOPHILS NFR BLD AUTO: 1 %
BILIRUB SERPL-MCNC: 0.2 MG/DL
BUN SERPL-MCNC: 10.5 MG/DL (ref 6–20)
CALCIUM SERPL-MCNC: 9.1 MG/DL (ref 8.6–10)
CHLORIDE SERPL-SCNC: 106 MMOL/L (ref 98–107)
CREAT SERPL-MCNC: 0.7 MG/DL (ref 0.51–0.95)
DEPRECATED HCO3 PLAS-SCNC: 24 MMOL/L (ref 22–29)
EGFRCR SERPLBLD CKD-EPI 2021: >90 ML/MIN/1.73M2
EOSINOPHIL # BLD AUTO: 0 10E3/UL (ref 0–0.7)
EOSINOPHIL NFR BLD AUTO: 0 %
ERYTHROCYTE [DISTWIDTH] IN BLOOD BY AUTOMATED COUNT: 13.3 % (ref 10–15)
GLUCOSE SERPL-MCNC: 105 MG/DL (ref 70–99)
HCT VFR BLD AUTO: 38.9 % (ref 35–47)
HGB BLD-MCNC: 12.5 G/DL (ref 11.7–15.7)
IMM GRANULOCYTES # BLD: 0 10E3/UL
IMM GRANULOCYTES NFR BLD: 0 %
LYMPHOCYTES # BLD AUTO: 1.7 10E3/UL (ref 0.8–5.3)
LYMPHOCYTES NFR BLD AUTO: 32 %
MCH RBC QN AUTO: 29 PG (ref 26.5–33)
MCHC RBC AUTO-ENTMCNC: 32.1 G/DL (ref 31.5–36.5)
MCV RBC AUTO: 90 FL (ref 78–100)
MONOCYTES # BLD AUTO: 0.3 10E3/UL (ref 0–1.3)
MONOCYTES NFR BLD AUTO: 6 %
NEUTROPHILS # BLD AUTO: 3.1 10E3/UL (ref 1.6–8.3)
NEUTROPHILS NFR BLD AUTO: 61 %
NRBC # BLD AUTO: 0 10E3/UL
NRBC BLD AUTO-RTO: 0 /100
PLATELET # BLD AUTO: 269 10E3/UL (ref 150–450)
POTASSIUM SERPL-SCNC: 4.4 MMOL/L (ref 3.4–5.3)
PROT SERPL-MCNC: 7.5 G/DL (ref 6.4–8.3)
RBC # BLD AUTO: 4.31 10E6/UL (ref 3.8–5.2)
SODIUM SERPL-SCNC: 139 MMOL/L (ref 135–145)
WBC # BLD AUTO: 5.1 10E3/UL (ref 4–11)

## 2024-05-13 PROCEDURE — 99213 OFFICE O/P EST LOW 20 MIN: CPT | Performed by: OBSTETRICS & GYNECOLOGY

## 2024-05-13 PROCEDURE — G0463 HOSPITAL OUTPT CLINIC VISIT: HCPCS | Performed by: OBSTETRICS & GYNECOLOGY

## 2024-05-13 PROCEDURE — 96401 CHEMO ANTI-NEOPL SQ/IM: CPT

## 2024-05-13 PROCEDURE — 82040 ASSAY OF SERUM ALBUMIN: CPT | Performed by: NURSE PRACTITIONER

## 2024-05-13 PROCEDURE — 36415 COLL VENOUS BLD VENIPUNCTURE: CPT | Performed by: NURSE PRACTITIONER

## 2024-05-13 PROCEDURE — 84702 CHORIONIC GONADOTROPIN TEST: CPT | Performed by: NURSE PRACTITIONER

## 2024-05-13 PROCEDURE — 250N000011 HC RX IP 250 OP 636: Performed by: OBSTETRICS & GYNECOLOGY

## 2024-05-13 PROCEDURE — 99207 PR NO CHARGE LOS: CPT

## 2024-05-13 PROCEDURE — 85049 AUTOMATED PLATELET COUNT: CPT | Performed by: NURSE PRACTITIONER

## 2024-05-13 RX ORDER — MEPERIDINE HYDROCHLORIDE 25 MG/ML
25 INJECTION INTRAMUSCULAR; INTRAVENOUS; SUBCUTANEOUS EVERY 30 MIN PRN
Status: CANCELLED | OUTPATIENT
Start: 2024-05-13

## 2024-05-13 RX ORDER — DIPHENHYDRAMINE HYDROCHLORIDE 50 MG/ML
50 INJECTION INTRAMUSCULAR; INTRAVENOUS
Status: CANCELLED
Start: 2024-05-13

## 2024-05-13 RX ORDER — EPINEPHRINE 1 MG/ML
0.3 INJECTION, SOLUTION, CONCENTRATE INTRAVENOUS EVERY 5 MIN PRN
Status: CANCELLED | OUTPATIENT
Start: 2024-05-16

## 2024-05-13 RX ORDER — DIPHENHYDRAMINE HYDROCHLORIDE 50 MG/ML
50 INJECTION INTRAMUSCULAR; INTRAVENOUS
Status: CANCELLED
Start: 2024-05-15

## 2024-05-13 RX ORDER — METHOTREXATE 25 MG/ML
25 INJECTION, SOLUTION INTRA-ARTERIAL; INTRAMUSCULAR; INTRAVENOUS ONCE
Status: CANCELLED | OUTPATIENT
Start: 2024-05-13 | End: 2024-05-13

## 2024-05-13 RX ORDER — EPINEPHRINE 1 MG/ML
0.3 INJECTION, SOLUTION, CONCENTRATE INTRAVENOUS EVERY 5 MIN PRN
Status: CANCELLED | OUTPATIENT
Start: 2024-05-15

## 2024-05-13 RX ORDER — METHOTREXATE 25 MG/ML
25 INJECTION, SOLUTION INTRA-ARTERIAL; INTRAMUSCULAR; INTRAVENOUS ONCE
Status: CANCELLED | OUTPATIENT
Start: 2024-05-14 | End: 2024-05-14

## 2024-05-13 RX ORDER — ALBUTEROL SULFATE 0.83 MG/ML
2.5 SOLUTION RESPIRATORY (INHALATION)
Status: CANCELLED | OUTPATIENT
Start: 2024-05-13

## 2024-05-13 RX ORDER — MEPERIDINE HYDROCHLORIDE 25 MG/ML
25 INJECTION INTRAMUSCULAR; INTRAVENOUS; SUBCUTANEOUS EVERY 30 MIN PRN
Status: CANCELLED | OUTPATIENT
Start: 2024-05-15

## 2024-05-13 RX ORDER — ALBUTEROL SULFATE 0.83 MG/ML
2.5 SOLUTION RESPIRATORY (INHALATION)
Status: CANCELLED | OUTPATIENT
Start: 2024-05-17

## 2024-05-13 RX ORDER — ALBUTEROL SULFATE 90 UG/1
1-2 AEROSOL, METERED RESPIRATORY (INHALATION)
Status: CANCELLED
Start: 2024-05-17

## 2024-05-13 RX ORDER — DIPHENHYDRAMINE HYDROCHLORIDE 50 MG/ML
50 INJECTION INTRAMUSCULAR; INTRAVENOUS
Status: CANCELLED
Start: 2024-05-17

## 2024-05-13 RX ORDER — MEPERIDINE HYDROCHLORIDE 25 MG/ML
25 INJECTION INTRAMUSCULAR; INTRAVENOUS; SUBCUTANEOUS EVERY 30 MIN PRN
Status: CANCELLED | OUTPATIENT
Start: 2024-05-17

## 2024-05-13 RX ORDER — LORAZEPAM 2 MG/ML
0.5 INJECTION INTRAMUSCULAR EVERY 4 HOURS PRN
Status: CANCELLED | OUTPATIENT
Start: 2024-05-17

## 2024-05-13 RX ORDER — MEPERIDINE HYDROCHLORIDE 25 MG/ML
25 INJECTION INTRAMUSCULAR; INTRAVENOUS; SUBCUTANEOUS EVERY 30 MIN PRN
Status: CANCELLED | OUTPATIENT
Start: 2024-05-14

## 2024-05-13 RX ORDER — LORAZEPAM 2 MG/ML
0.5 INJECTION INTRAMUSCULAR EVERY 4 HOURS PRN
Status: CANCELLED | OUTPATIENT
Start: 2024-05-27

## 2024-05-13 RX ORDER — LORAZEPAM 2 MG/ML
0.5 INJECTION INTRAMUSCULAR EVERY 4 HOURS PRN
Status: CANCELLED | OUTPATIENT
Start: 2024-05-16

## 2024-05-13 RX ORDER — DIPHENHYDRAMINE HYDROCHLORIDE 50 MG/ML
50 INJECTION INTRAMUSCULAR; INTRAVENOUS
Status: CANCELLED
Start: 2024-05-14

## 2024-05-13 RX ORDER — EPINEPHRINE 1 MG/ML
0.3 INJECTION, SOLUTION, CONCENTRATE INTRAVENOUS EVERY 5 MIN PRN
Status: CANCELLED | OUTPATIENT
Start: 2024-05-13

## 2024-05-13 RX ORDER — ALBUTEROL SULFATE 90 UG/1
1-2 AEROSOL, METERED RESPIRATORY (INHALATION)
Status: CANCELLED
Start: 2024-05-16

## 2024-05-13 RX ORDER — METHOTREXATE 25 MG/ML
25 INJECTION, SOLUTION INTRA-ARTERIAL; INTRAMUSCULAR; INTRAVENOUS ONCE
Status: CANCELLED | OUTPATIENT
Start: 2024-05-17 | End: 2024-05-17

## 2024-05-13 RX ORDER — MEPERIDINE HYDROCHLORIDE 25 MG/ML
25 INJECTION INTRAMUSCULAR; INTRAVENOUS; SUBCUTANEOUS EVERY 30 MIN PRN
Status: CANCELLED | OUTPATIENT
Start: 2024-05-16

## 2024-05-13 RX ORDER — EPINEPHRINE 1 MG/ML
0.3 INJECTION, SOLUTION, CONCENTRATE INTRAVENOUS EVERY 5 MIN PRN
Status: CANCELLED | OUTPATIENT
Start: 2024-05-17

## 2024-05-13 RX ORDER — ALBUTEROL SULFATE 90 UG/1
1-2 AEROSOL, METERED RESPIRATORY (INHALATION)
Status: CANCELLED
Start: 2024-05-15

## 2024-05-13 RX ORDER — LORAZEPAM 2 MG/ML
0.5 INJECTION INTRAMUSCULAR EVERY 4 HOURS PRN
Status: CANCELLED | OUTPATIENT
Start: 2024-05-15

## 2024-05-13 RX ORDER — METHOTREXATE 25 MG/ML
25 INJECTION, SOLUTION INTRA-ARTERIAL; INTRAMUSCULAR; INTRAVENOUS ONCE
Status: CANCELLED | OUTPATIENT
Start: 2024-05-16 | End: 2024-05-16

## 2024-05-13 RX ORDER — ALBUTEROL SULFATE 0.83 MG/ML
2.5 SOLUTION RESPIRATORY (INHALATION)
Status: CANCELLED | OUTPATIENT
Start: 2024-05-14

## 2024-05-13 RX ORDER — LORAZEPAM 2 MG/ML
0.5 INJECTION INTRAMUSCULAR EVERY 4 HOURS PRN
Status: CANCELLED | OUTPATIENT
Start: 2024-05-13

## 2024-05-13 RX ORDER — EPINEPHRINE 1 MG/ML
0.3 INJECTION, SOLUTION, CONCENTRATE INTRAVENOUS EVERY 5 MIN PRN
Status: CANCELLED | OUTPATIENT
Start: 2024-05-14

## 2024-05-13 RX ORDER — METHOTREXATE 25 MG/ML
25 INJECTION, SOLUTION INTRA-ARTERIAL; INTRAMUSCULAR; INTRAVENOUS ONCE
Status: CANCELLED | OUTPATIENT
Start: 2024-05-15 | End: 2024-05-15

## 2024-05-13 RX ORDER — DIPHENHYDRAMINE HYDROCHLORIDE 50 MG/ML
50 INJECTION INTRAMUSCULAR; INTRAVENOUS
Status: CANCELLED
Start: 2024-05-16

## 2024-05-13 RX ORDER — ALBUTEROL SULFATE 0.83 MG/ML
2.5 SOLUTION RESPIRATORY (INHALATION)
Status: CANCELLED | OUTPATIENT
Start: 2024-05-15

## 2024-05-13 RX ORDER — ALBUTEROL SULFATE 90 UG/1
1-2 AEROSOL, METERED RESPIRATORY (INHALATION)
Status: CANCELLED
Start: 2024-05-13

## 2024-05-13 RX ORDER — LORAZEPAM 2 MG/ML
0.5 INJECTION INTRAMUSCULAR EVERY 4 HOURS PRN
Status: CANCELLED | OUTPATIENT
Start: 2024-05-14

## 2024-05-13 RX ORDER — ALBUTEROL SULFATE 90 UG/1
1-2 AEROSOL, METERED RESPIRATORY (INHALATION)
Status: CANCELLED
Start: 2024-05-14

## 2024-05-13 RX ORDER — ALBUTEROL SULFATE 0.83 MG/ML
2.5 SOLUTION RESPIRATORY (INHALATION)
Status: CANCELLED | OUTPATIENT
Start: 2024-05-16

## 2024-05-13 RX ORDER — METHOTREXATE 25 MG/ML
25 INJECTION, SOLUTION INTRA-ARTERIAL; INTRAMUSCULAR; INTRAVENOUS ONCE
Status: COMPLETED | OUTPATIENT
Start: 2024-05-13 | End: 2024-05-13

## 2024-05-13 RX ADMIN — METHOTREXATE 25 MG: 25 SOLUTION INTRA-ARTERIAL; INTRAMUSCULAR; INTRATHECAL; INTRAVENOUS at 12:59

## 2024-05-13 ASSESSMENT — PAIN SCALES - GENERAL: PAINLEVEL: NO PAIN (0)

## 2024-05-13 NOTE — PROGRESS NOTES
Infusion Nursing Note:  Verito Yee presents today for C3D1 Methotrexate.    Patient seen by provider today: Yes: Dr. Sommer   present during visit today: Not Applicable.    Note: Patient reports tolerating treatment well.  She is hoping to feel good this weekend, as she and her significant other are getting  on Saturday.    Intravenous Access:  No Intravenous access/labs at this visit.    Treatment Conditions:  Lab Results   Component Value Date    HGB 12.5 05/13/2024    WBC 5.1 05/13/2024    ANEUTAUTO 3.1 05/13/2024     05/13/2024        Lab Results   Component Value Date     05/13/2024    POTASSIUM 4.4 05/13/2024    CR 0.70 05/13/2024    PER 9.1 05/13/2024    BILITOTAL 0.2 05/13/2024    ALBUMIN 4.4 05/13/2024    ALT 7 05/13/2024    AST 20 05/13/2024       Results reviewed, labs MET treatment parameters, ok to proceed with treatment.    Post Infusion Assessment:  Patient tolerated injection without incident.     Discharge Plan:   Patient will return 5/14/2024 for next appointment.   Future appts have been reviewed and crosschecked with appt note and plan.  Patient discharged in stable condition accompanied by: partner.  Departure Mode: Ambulatory.    Domitila Matos RN-BSN, PHN, OCN  [facility]

## 2024-05-13 NOTE — PROGRESS NOTES
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     Verito is overall doing very well.  She reports that she has had some significant fatigue from the chemotherapy.  She does note mouth sores which occur at the end of her treatment doses and then resolve after a few days.  She does not feel that they are very severe.  She is able to keep up her food intake.  She denies any other symptoms.  She did start her period last week and it did last a full week and was a bit heavier than her usual periods.      ONCOLOGY HISTORY:     2/15/24 TVUS: nonviable fetal pole measuring 6w0d. Normal bilateral adnexa. The patient underwent expectant management.      2 weeks later she began having heavy bleeding and cramping.  This lasted 2 weeks.      : HCG 7180  :    3/11:   3/19:      3/21/24 TVUS: Retroverted uterus, endometrium with heterogenous material with vascularity. Normal bilateral ovaries, no free fluid.      3/22:      3/26: D&C              Path: Endometrium with gestational type changes and previous implantation site, no chorionic villi identified.      :     24: Pelvic US  UTERUS: 7.7 x 4.2 x 5.3 cm. Normal in size and position with no masses.     ENDOMETRIUM: 6 mm. No focal endometrial lesion can be seen. No endometrial region fluid collection identified.    24:     24: CT CAP impression  IMPRESSION:  Unremarkable CT of the chest abdomen and pelvis without evidence of  neoplastic process    4/15/24: C1 methotrexate 25mg IM days 1-5, beta hCG 233  24: C2 methotrexate 25mg IM days 1-5, beta hCG 5  2024: C3 methotrexate 25mg IM days 1-5,  "beta hCG pending    Past Medical History:   Diagnosis Date    Gestational trophoblastic disease         Past Surgical History:   Procedure Laterality Date    DILATION AND CURETTAGE      TONSILLECTOMY       Social History     Socioeconomic History    Marital status: Single     Spouse name: None    Number of children: None    Years of education: None    Highest education level: None   Tobacco Use    Smoking status: Never     Passive exposure: Never    Smokeless tobacco: Never   Vaping Use    Vaping status: Never Used   Substance and Sexual Activity    Alcohol use: Yes     Comment: 6 drinks per week    Drug use: Never    Sexual activity: Yes     Partners: Male      Family History   Problem Relation Age of Onset    Heart Disease Father       Current Outpatient Medications   Medication Sig Dispense Refill    norethindrone-ethinyl estradiol (MICROGESTIN 1/20) 1-20 MG-MCG tablet Take 1 tablet by mouth daily 30 tablet 2    ondansetron (ZOFRAN) 4 MG tablet Take 1-2 tablets (4-8 mg) by mouth every 6 hours as needed for nausea 30 tablet 1     No current facility-administered medications for this visit.     Facility-Administered Medications Ordered in Other Visits   Medication Dose Route Frequency Provider Last Rate Last Admin    methotrexate injection 25 mg  25 mg Intramuscular Once Carson Sommer MD          No Known Allergies       OBJECTIVE:    PHYSICAL EXAM:  /86 (BP Location: Right arm, Patient Position: Chair, Cuff Size: Adult Regular)   Pulse 81   Ht 1.626 m (5' 4\")   Wt 68 kg (150 lb)   SpO2 100%   BMI 25.75 kg/m       Constitutional: Alert and oriented non-toxic appearing female in no acute distress  HEENT: No periorbital edema or perioral cyanosis  Resp: Respirations unlabored, speaking in full sentences without apparent dyspnea, wheezing, or cough  Psych: Pleasant and interactive, affect euthymic, makes appropriate eye contact, answers questions appropriately, thought content " logical      DATA:    Weight trend:  Wt Readings from Last 5 Encounters:   24 68 kg (150 lb)   24 70.5 kg (155 lb 8 oz)   24 68 kg (150 lb)   04/15/24 70.8 kg (156 lb 1.6 oz)   04/15/24 68 kg (150 lb)        Labs:     Latest Reference Range & Units 24 11:51   Sodium 135 - 145 mmol/L 139   Potassium 3.4 - 5.3 mmol/L 4.4   Chloride 98 - 107 mmol/L 106   Carbon Dioxide (CO2) 22 - 29 mmol/L 24   Urea Nitrogen 6.0 - 20.0 mg/dL 10.5   Creatinine 0.51 - 0.95 mg/dL 0.70   GFR Estimate >60 mL/min/1.73m2 >90   Calcium 8.6 - 10.0 mg/dL 9.1   Anion Gap 7 - 15 mmol/L 9   Albumin 3.5 - 5.2 g/dL 4.4   Protein Total 6.4 - 8.3 g/dL 7.5   Alkaline Phosphatase 40 - 150 U/L 59   ALT 0 - 50 U/L 7   AST 0 - 45 U/L 20   Bilirubin Total <=1.2 mg/dL 0.2   Glucose 70 - 99 mg/dL 105 (H)   WBC 4.0 - 11.0 10e3/uL 5.1   Hemoglobin 11.7 - 15.7 g/dL 12.5   Hematocrit 35.0 - 47.0 % 38.9   Platelet Count 150 - 450 10e3/uL 269   RBC Count 3.80 - 5.20 10e6/uL 4.31   MCV 78 - 100 fL 90   MCH 26.5 - 33.0 pg 29.0   MCHC 31.5 - 36.5 g/dL 32.1   RDW 10.0 - 15.0 % 13.3   % Neutrophils % 61   % Lymphocytes % 32   % Monocytes % 6   % Eosinophils % 0   % Basophils % 1   Absolute Basophils 0.0 - 0.2 10e3/uL 0.0   Absolute Eosinophils 0.0 - 0.7 10e3/uL 0.0   Absolute Immature Granulocytes <=0.4 10e3/uL 0.0   Absolute Lymphocytes 0.8 - 5.3 10e3/uL 1.7   Absolute Monocytes 0.0 - 1.3 10e3/uL 0.3   % Immature Granulocytes % 0   Absolute Neutrophils 1.6 - 8.3 10e3/uL 3.1   Absolute NRBCs 10e3/uL 0.0   NRBCs per 100 WBC <1 /100 0   (H): Data is abnormally high      ASSESSMENT/PLAN:     female with 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  She has had good disease response thus far.    -Undergoing chemotherapy with methotrexate 0.4mg/kg (25mg max dose) IM days 1-5 on 14 day cycle with plan to treat for two additional cycles after beta hCG <5. Tolerated first 2 cycles of methotrexate without dose  limiting toxicities. Proceed with cycle 3 of methotrexate. Continue OCP daily.  Reviewed alarm signs and symptoms and when to seek further care.     2) Treatment/disease related effects:  Stomatitis: Two small sores managed with salt and soda rinses, not impacting ability to eat or drink. Discussed topical agents such as Orajel or Magic Mouthwash- denies need for further intervention at this time.      Carson Sommer MD

## 2024-05-13 NOTE — LETTER
2024         RE: Ver[facility] Yee  [address] [facility] [address]        Dear Colleague,    Thank you for referring your patient, Verito Yee, to the [facility]. Please see a copy of my visit note below.      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     Verito is overall doing very well.  She reports that she has had some significant fatigue from the chemotherapy.  She does note mouth sores which occur at the end of her treatment doses and then resolve after a few days.  She does not feel that they are very severe.  She is able to keep up her food intake.  She denies any other symptoms.  She did start her period last week and it did last a full week and was a bit heavier than her usual periods.      ONCOLOGY HISTORY:     2/15/24 TVUS: nonviable fetal pole measuring 6w0d. Normal bilateral adnexa. The patient underwent expectant management.      2 weeks later she began having heavy bleeding and cramping.  This lasted 2 weeks.      : HCG 7180  :    3/11:   3/19:      3/21/24 TVUS: Retroverted uterus, endometrium with heterogenous material with vascularity. Normal bilateral ovaries, no free fluid.      3/22:      3/26: D&C              Path: Endometrium with gestational type changes and previous implantation site, no chorionic villi identified.      :     24: Pelvic US  UTERUS: 7.7 x 4.2 x 5.3 cm. Normal in size and position with no masses.     ENDOMETRIUM: 6 mm. No focal endometrial lesion can be seen. No endometrial region fluid collection identified.    24:     24: CT CAP  "impression  IMPRESSION:  Unremarkable CT of the chest abdomen and pelvis without evidence of  neoplastic process    4/15/24: C1 methotrexate 25mg IM days 1-5, beta hCG 233  4/29/24: C2 methotrexate 25mg IM days 1-5, beta hCG 5  5/13/2024: C3 methotrexate 25mg IM days 1-5, beta hCG pending    Past Medical History:   Diagnosis Date     Gestational trophoblastic disease         Past Surgical History:   Procedure Laterality Date     DILATION AND CURETTAGE       TONSILLECTOMY       Social History     Socioeconomic History     Marital status: Single     Spouse name: None     Number of children: None     Years of education: None     Highest education level: None   Tobacco Use     Smoking status: Never     Passive exposure: Never     Smokeless tobacco: Never   Vaping Use     Vaping status: Never Used   Substance and Sexual Activity     Alcohol use: Yes     Comment: 6 drinks per week     Drug use: Never     Sexual activity: Yes     Partners: Male      Family History   Problem Relation Age of Onset     Heart Disease Father       Current Outpatient Medications   Medication Sig Dispense Refill     norethindrone-ethinyl estradiol (MICROGESTIN 1/20) 1-20 MG-MCG tablet Take 1 tablet by mouth daily 30 tablet 2     ondansetron (ZOFRAN) 4 MG tablet Take 1-2 tablets (4-8 mg) by mouth every 6 hours as needed for nausea 30 tablet 1     No current facility-administered medications for this visit.     Facility-Administered Medications Ordered in Other Visits   Medication Dose Route Frequency Provider Last Rate Last Admin     methotrexate injection 25 mg  25 mg Intramuscular Once Carson Sommer MD          No Known Allergies       OBJECTIVE:    PHYSICAL EXAM:  /86 (BP Location: Right arm, Patient Position: Chair, Cuff Size: Adult Regular)   Pulse 81   Ht 1.626 m (5' 4\")   Wt 68 kg (150 lb)   SpO2 100%   BMI 25.75 kg/m       Constitutional: Alert and oriented non-toxic appearing female in no acute distress  HEENT: No " periorbital edema or perioral cyanosis  Resp: Respirations unlabored, speaking in full sentences without apparent dyspnea, wheezing, or cough  Psych: Pleasant and interactive, affect euthymic, makes appropriate eye contact, answers questions appropriately, thought content logical      DATA:    Weight trend:  Wt Readings from Last 5 Encounters:   24 68 kg (150 lb)   24 70.5 kg (155 lb 8 oz)   24 68 kg (150 lb)   04/15/24 70.8 kg (156 lb 1.6 oz)   04/15/24 68 kg (150 lb)        Labs:     Latest Reference Range & Units 24 11:51   Sodium 135 - 145 mmol/L 139   Potassium 3.4 - 5.3 mmol/L 4.4   Chloride 98 - 107 mmol/L 106   Carbon Dioxide (CO2) 22 - 29 mmol/L 24   Urea Nitrogen 6.0 - 20.0 mg/dL 10.5   Creatinine 0.51 - 0.95 mg/dL 0.70   GFR Estimate >60 mL/min/1.73m2 >90   Calcium 8.6 - 10.0 mg/dL 9.1   Anion Gap 7 - 15 mmol/L 9   Albumin 3.5 - 5.2 g/dL 4.4   Protein Total 6.4 - 8.3 g/dL 7.5   Alkaline Phosphatase 40 - 150 U/L 59   ALT 0 - 50 U/L 7   AST 0 - 45 U/L 20   Bilirubin Total <=1.2 mg/dL 0.2   Glucose 70 - 99 mg/dL 105 (H)   WBC 4.0 - 11.0 10e3/uL 5.1   Hemoglobin 11.7 - 15.7 g/dL 12.5   Hematocrit 35.0 - 47.0 % 38.9   Platelet Count 150 - 450 10e3/uL 269   RBC Count 3.80 - 5.20 10e6/uL 4.31   MCV 78 - 100 fL 90   MCH 26.5 - 33.0 pg 29.0   MCHC 31.5 - 36.5 g/dL 32.1   RDW 10.0 - 15.0 % 13.3   % Neutrophils % 61   % Lymphocytes % 32   % Monocytes % 6   % Eosinophils % 0   % Basophils % 1   Absolute Basophils 0.0 - 0.2 10e3/uL 0.0   Absolute Eosinophils 0.0 - 0.7 10e3/uL 0.0   Absolute Immature Granulocytes <=0.4 10e3/uL 0.0   Absolute Lymphocytes 0.8 - 5.3 10e3/uL 1.7   Absolute Monocytes 0.0 - 1.3 10e3/uL 0.3   % Immature Granulocytes % 0   Absolute Neutrophils 1.6 - 8.3 10e3/uL 3.1   Absolute NRBCs 10e3/uL 0.0   NRBCs per 100 WBC <1 /100 0   (H): Data is abnormally high      ASSESSMENT/PLAN:     female with 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  She has had good disease response thus far.    -Undergoing chemotherapy with methotrexate 0.4mg/kg (25mg max dose) IM days 1-5 on 14 day cycle with plan to treat for two additional cycles after beta hCG <5. Tolerated first 2 cycles of methotrexate without dose limiting toxicities. Proceed with cycle 3 of methotrexate. Continue OCP daily.  Reviewed alarm signs and symptoms and when to seek further care.     2) Treatment/disease related effects:  Stomatitis: Two small sores managed with salt and soda rinses, not impacting ability to eat or drink. Discussed topical agents such as Orajel or Magic Mouthwash- denies need for further intervention at this time.      Carson Sommer MD       Again, thank you for allowing me to participate in the care of your patient.        Sincerely,        Carson Sommer MD

## 2024-05-13 NOTE — NURSING NOTE
"Oncology Rooming Note    May 13, 2024 12:13 PM   Verito Yee is a 43 year old female who presents for:    Chief Complaint   Patient presents with    Oncology Clinic Visit     Prior to tx     Initial Vitals: /86 (BP Location: Right arm, Patient Position: Chair, Cuff Size: Adult Regular)   Pulse 81   Ht 1.626 m (5' 4\")   Wt 68 kg (150 lb)   SpO2 100%   BMI 25.75 kg/m   Estimated body mass index is 25.75 kg/m  as calculated from the following:    Height as of this encounter: 1.626 m (5' 4\").    Weight as of this encounter: 68 kg (150 lb). Body surface area is 1.75 meters squared.  No Pain (0) Comment: Data Unavailable   No LMP recorded.  Allergies reviewed: Yes  Medications reviewed: Yes    Medications: Medication refills not needed today.  Pharmacy name entered into [employer]: [id number] - [place], [place] - [address]    Frailty Screening:   Is the patient here for a new oncology consult visit in cancer care? 2. No      Clinical concerns: NO       Cornelia Lobo CMA              "

## 2024-05-14 ENCOUNTER — INFUSION THERAPY VISIT (OUTPATIENT)
Dept: INFUSION THERAPY | Facility: CLINIC | Age: 44
End: 2024-05-14
Attending: NURSE PRACTITIONER
Payer: COMMERCIAL

## 2024-05-14 VITALS
HEART RATE: 84 BPM | SYSTOLIC BLOOD PRESSURE: 127 MMHG | RESPIRATION RATE: 16 BRPM | DIASTOLIC BLOOD PRESSURE: 88 MMHG | OXYGEN SATURATION: 100 % | TEMPERATURE: 97.4 F

## 2024-05-14 DIAGNOSIS — O01.9 GESTATIONAL TROPHOBLASTIC NEOPLASM: Primary | ICD-10-CM

## 2024-05-14 LAB — HCG-TM SERPL-ACNC: <3 IU/L

## 2024-05-14 PROCEDURE — 99207 PR NO CHARGE LOS: CPT

## 2024-05-14 PROCEDURE — 250N000011 HC RX IP 250 OP 636: Performed by: OBSTETRICS & GYNECOLOGY

## 2024-05-14 PROCEDURE — 96401 CHEMO ANTI-NEOPL SQ/IM: CPT

## 2024-05-14 RX ORDER — METHOTREXATE 25 MG/ML
25 INJECTION, SOLUTION INTRA-ARTERIAL; INTRAMUSCULAR; INTRAVENOUS ONCE
Status: COMPLETED | OUTPATIENT
Start: 2024-05-14 | End: 2024-05-14

## 2024-05-14 RX ADMIN — METHOTREXATE 25 MG: 25 SOLUTION INTRA-ARTERIAL; INTRAMUSCULAR; INTRATHECAL; INTRAVENOUS at 14:02

## 2024-05-14 NOTE — PROGRESS NOTES
Infusion Nursing Note:  Verito Yee presents today for C3D2 Methotrexate injection.    Patient seen by provider today: No   present during visit today: Not Applicable.    Note: Patient denies new concerns overnight. She states that she is feeling well overall today and was happy to hear that her HCG level was <3. See flowsheets for full assessment.    Intravenous Access:  No Intravenous access/labs at this visit.    Treatment Conditions:  Not Applicable.    Post Infusion Assessment:  Patient tolerated infusion without incident.     Discharge Plan:   Future appts have been reviewed and crosschecked with appt note and plan.  AVS to patient via [employer].  Patient will return 5/15/24 for next appointment.   Patient discharged in stable condition accompanied by: .  Departure Mode: Ambulatory.      Janey Thomason RN BSN OCN

## 2024-05-15 ENCOUNTER — INFUSION THERAPY VISIT (OUTPATIENT)
Dept: INFUSION THERAPY | Facility: CLINIC | Age: 44
End: 2024-05-15
Attending: NURSE PRACTITIONER
Payer: COMMERCIAL

## 2024-05-15 VITALS
OXYGEN SATURATION: 97 % | SYSTOLIC BLOOD PRESSURE: 145 MMHG | HEART RATE: 82 BPM | TEMPERATURE: 97.9 F | RESPIRATION RATE: 16 BRPM | DIASTOLIC BLOOD PRESSURE: 90 MMHG

## 2024-05-15 DIAGNOSIS — O01.9 GESTATIONAL TROPHOBLASTIC NEOPLASM: Primary | ICD-10-CM

## 2024-05-15 PROCEDURE — 96401 CHEMO ANTI-NEOPL SQ/IM: CPT

## 2024-05-15 PROCEDURE — 99207 PR NO CHARGE LOS: CPT

## 2024-05-15 PROCEDURE — 250N000011 HC RX IP 250 OP 636: Performed by: OBSTETRICS & GYNECOLOGY

## 2024-05-15 RX ORDER — METHOTREXATE 25 MG/ML
25 INJECTION, SOLUTION INTRA-ARTERIAL; INTRAMUSCULAR; INTRAVENOUS ONCE
Status: COMPLETED | OUTPATIENT
Start: 2024-05-15 | End: 2024-05-15

## 2024-05-15 RX ADMIN — METHOTREXATE 25 MG: 25 SOLUTION INTRA-ARTERIAL; INTRAMUSCULAR; INTRATHECAL; INTRAVENOUS at 14:08

## 2024-05-15 ASSESSMENT — PAIN SCALES - GENERAL: PAINLEVEL: NO PAIN (0)

## 2024-05-15 NOTE — PROGRESS NOTES
Infusion Nursing Note:  Verito Yee presents today for C3D3 Methotrexate.    Patient seen by provider today: No   present during visit today: Not Applicable.    Note: Verito is doing ok but dis have some nausea this morning.  She had not had to use the PRN zofran yet.  We discussed that she could use it if the nausea comes back as it may be helpful.  She agrees to the plan.      Intravenous Access:  No Intravenous access/labs at this visit.    Treatment Conditions:  Not Applicable.      Post Infusion Assessment:  Patient tolerated injection without incident.       Discharge Plan:   Patient discharged in stable condition   Return 5/16/2024  Departure Mode: Ambulatory.      Leslie Lo RN    
07-Jun-2018

## 2024-05-16 ENCOUNTER — INFUSION THERAPY VISIT (OUTPATIENT)
Dept: INFUSION THERAPY | Facility: CLINIC | Age: 44
End: 2024-05-16
Attending: NURSE PRACTITIONER
Payer: COMMERCIAL

## 2024-05-16 VITALS
OXYGEN SATURATION: 97 % | RESPIRATION RATE: 14 BRPM | HEART RATE: 71 BPM | TEMPERATURE: 97.6 F | SYSTOLIC BLOOD PRESSURE: 130 MMHG | DIASTOLIC BLOOD PRESSURE: 83 MMHG

## 2024-05-16 DIAGNOSIS — O01.9 GESTATIONAL TROPHOBLASTIC NEOPLASM: Primary | ICD-10-CM

## 2024-05-16 PROCEDURE — 250N000011 HC RX IP 250 OP 636: Performed by: OBSTETRICS & GYNECOLOGY

## 2024-05-16 PROCEDURE — 96401 CHEMO ANTI-NEOPL SQ/IM: CPT

## 2024-05-16 PROCEDURE — 99207 PR NO CHARGE LOS: CPT

## 2024-05-16 RX ORDER — METHOTREXATE 25 MG/ML
25 INJECTION, SOLUTION INTRA-ARTERIAL; INTRAMUSCULAR; INTRAVENOUS ONCE
Status: COMPLETED | OUTPATIENT
Start: 2024-05-16 | End: 2024-05-16

## 2024-05-16 RX ADMIN — METHOTREXATE 25 MG: 25 SOLUTION INTRA-ARTERIAL; INTRAMUSCULAR; INTRATHECAL; INTRAVENOUS at 14:26

## 2024-05-17 ENCOUNTER — INFUSION THERAPY VISIT (OUTPATIENT)
Dept: INFUSION THERAPY | Facility: CLINIC | Age: 44
End: 2024-05-17
Attending: NURSE PRACTITIONER
Payer: COMMERCIAL

## 2024-05-17 VITALS
HEART RATE: 93 BPM | SYSTOLIC BLOOD PRESSURE: 130 MMHG | OXYGEN SATURATION: 97 % | RESPIRATION RATE: 16 BRPM | DIASTOLIC BLOOD PRESSURE: 87 MMHG

## 2024-05-17 DIAGNOSIS — O01.9 GESTATIONAL TROPHOBLASTIC NEOPLASM: Primary | ICD-10-CM

## 2024-05-17 PROCEDURE — 96401 CHEMO ANTI-NEOPL SQ/IM: CPT

## 2024-05-17 PROCEDURE — 250N000011 HC RX IP 250 OP 636: Performed by: OBSTETRICS & GYNECOLOGY

## 2024-05-17 PROCEDURE — 99207 PR NO CHARGE LOS: CPT

## 2024-05-17 RX ORDER — EPINEPHRINE 1 MG/ML
0.3 INJECTION, SOLUTION INTRAMUSCULAR; SUBCUTANEOUS EVERY 5 MIN PRN
Status: CANCELLED | OUTPATIENT
Start: 2024-05-17

## 2024-05-17 RX ORDER — ALBUTEROL SULFATE 0.83 MG/ML
2.5 SOLUTION RESPIRATORY (INHALATION)
Status: CANCELLED | OUTPATIENT
Start: 2024-05-17

## 2024-05-17 RX ORDER — METHOTREXATE 25 MG/ML
25 INJECTION, SOLUTION INTRA-ARTERIAL; INTRAMUSCULAR; INTRAVENOUS ONCE
Status: COMPLETED | OUTPATIENT
Start: 2024-05-17 | End: 2024-05-17

## 2024-05-17 RX ORDER — ALBUTEROL SULFATE 90 UG/1
1-2 AEROSOL, METERED RESPIRATORY (INHALATION)
Status: CANCELLED
Start: 2024-05-17

## 2024-05-17 RX ORDER — MEPERIDINE HYDROCHLORIDE 25 MG/ML
25 INJECTION INTRAMUSCULAR; INTRAVENOUS; SUBCUTANEOUS EVERY 30 MIN PRN
Status: CANCELLED | OUTPATIENT
Start: 2024-05-17

## 2024-05-17 RX ORDER — METHYLPREDNISOLONE SODIUM SUCCINATE 125 MG/2ML
125 INJECTION, POWDER, LYOPHILIZED, FOR SOLUTION INTRAMUSCULAR; INTRAVENOUS
Status: CANCELLED
Start: 2024-05-17

## 2024-05-17 RX ORDER — DIPHENHYDRAMINE HYDROCHLORIDE 50 MG/ML
50 INJECTION INTRAMUSCULAR; INTRAVENOUS
Status: CANCELLED
Start: 2024-05-17

## 2024-05-17 RX ADMIN — METHOTREXATE 25 MG: 25 SOLUTION INTRA-ARTERIAL; INTRAMUSCULAR; INTRATHECAL; INTRAVENOUS at 14:05

## 2024-05-17 NOTE — PROGRESS NOTES
Infusion Nursing Note:  Verito Yee presents today for C3D5 Methotrexate.    Patient seen by provider today: No   present during visit today: Not Applicable.    Note: Patient reports feeling well today. States that she does not have nausea or abdominal cramping that she experienced the other day. She is excited to get out of town [place] ..    Intravenous Access:  No Intravenous access/labs at this visit.    Treatment Conditions:  Not Applicable.    Post Infusion Assessment:  Patient tolerated injection without incident.     Discharge Plan:   Future appts have been reviewed and crosschecked with appt note and plan.  AVS to patient via [employer].  Patient will return 5/27/24 for next appointment.   Patient discharged in stable condition accompanied by: .  Departure Mode: Ambulatory.      Janey Thomason RN BSN OCN

## 2024-05-22 ENCOUNTER — VIRTUAL VISIT (OUTPATIENT)
Dept: ONCOLOGY | Facility: CLINIC | Age: 44
End: 2024-05-22
Attending: NURSE PRACTITIONER
Payer: COMMERCIAL

## 2024-05-22 VITALS — WEIGHT: 150 LBS | HEIGHT: 64 IN | BODY MASS INDEX: 25.61 KG/M2

## 2024-05-22 DIAGNOSIS — O01.9 GESTATIONAL TROPHOBLASTIC NEOPLASM: Primary | ICD-10-CM

## 2024-05-22 DIAGNOSIS — K12.1 STOMATITIS AND MUCOSITIS: ICD-10-CM

## 2024-05-22 DIAGNOSIS — K12.30 STOMATITIS AND MUCOSITIS: ICD-10-CM

## 2024-05-22 PROCEDURE — 99213 OFFICE O/P EST LOW 20 MIN: CPT | Mod: 95 | Performed by: NURSE PRACTITIONER

## 2024-05-22 RX ORDER — MEPERIDINE HYDROCHLORIDE 25 MG/ML
25 INJECTION INTRAMUSCULAR; INTRAVENOUS; SUBCUTANEOUS EVERY 30 MIN PRN
Status: CANCELLED | OUTPATIENT
Start: 2024-05-29

## 2024-05-22 RX ORDER — METHOTREXATE 25 MG/ML
25 INJECTION, SOLUTION INTRA-ARTERIAL; INTRAMUSCULAR; INTRAVENOUS ONCE
Status: CANCELLED | OUTPATIENT
Start: 2024-05-31 | End: 2024-05-31

## 2024-05-22 RX ORDER — METHYLPREDNISOLONE SODIUM SUCCINATE 125 MG/2ML
125 INJECTION, POWDER, LYOPHILIZED, FOR SOLUTION INTRAMUSCULAR; INTRAVENOUS
Status: CANCELLED
Start: 2024-05-30

## 2024-05-22 RX ORDER — METHOTREXATE 25 MG/ML
25 INJECTION, SOLUTION INTRA-ARTERIAL; INTRAMUSCULAR; INTRAVENOUS ONCE
Status: CANCELLED | OUTPATIENT
Start: 2024-05-29 | End: 2024-05-29

## 2024-05-22 RX ORDER — METHYLPREDNISOLONE SODIUM SUCCINATE 125 MG/2ML
125 INJECTION, POWDER, LYOPHILIZED, FOR SOLUTION INTRAMUSCULAR; INTRAVENOUS
Status: CANCELLED
Start: 2024-05-31

## 2024-05-22 RX ORDER — ALBUTEROL SULFATE 0.83 MG/ML
2.5 SOLUTION RESPIRATORY (INHALATION)
Status: CANCELLED | OUTPATIENT
Start: 2024-05-29

## 2024-05-22 RX ORDER — MEPERIDINE HYDROCHLORIDE 25 MG/ML
25 INJECTION INTRAMUSCULAR; INTRAVENOUS; SUBCUTANEOUS EVERY 30 MIN PRN
Status: CANCELLED | OUTPATIENT
Start: 2024-05-31

## 2024-05-22 RX ORDER — EPINEPHRINE 1 MG/ML
0.3 INJECTION, SOLUTION INTRAMUSCULAR; SUBCUTANEOUS EVERY 5 MIN PRN
Status: CANCELLED | OUTPATIENT
Start: 2024-05-30

## 2024-05-22 RX ORDER — EPINEPHRINE 1 MG/ML
0.3 INJECTION, SOLUTION INTRAMUSCULAR; SUBCUTANEOUS EVERY 5 MIN PRN
Status: CANCELLED | OUTPATIENT
Start: 2024-05-27

## 2024-05-22 RX ORDER — MEPERIDINE HYDROCHLORIDE 25 MG/ML
25 INJECTION INTRAMUSCULAR; INTRAVENOUS; SUBCUTANEOUS EVERY 30 MIN PRN
Status: CANCELLED | OUTPATIENT
Start: 2024-05-28

## 2024-05-22 RX ORDER — DIPHENHYDRAMINE HYDROCHLORIDE 50 MG/ML
50 INJECTION INTRAMUSCULAR; INTRAVENOUS
Status: CANCELLED
Start: 2024-05-27

## 2024-05-22 RX ORDER — ALBUTEROL SULFATE 90 UG/1
1-2 AEROSOL, METERED RESPIRATORY (INHALATION)
Status: CANCELLED
Start: 2024-05-28

## 2024-05-22 RX ORDER — DIPHENHYDRAMINE HYDROCHLORIDE 50 MG/ML
50 INJECTION INTRAMUSCULAR; INTRAVENOUS
Status: CANCELLED
Start: 2024-05-30

## 2024-05-22 RX ORDER — METHOTREXATE 25 MG/ML
25 INJECTION, SOLUTION INTRA-ARTERIAL; INTRAMUSCULAR; INTRAVENOUS ONCE
Status: CANCELLED | OUTPATIENT
Start: 2024-05-28 | End: 2024-05-28

## 2024-05-22 RX ORDER — MEPERIDINE HYDROCHLORIDE 25 MG/ML
25 INJECTION INTRAMUSCULAR; INTRAVENOUS; SUBCUTANEOUS EVERY 30 MIN PRN
Status: CANCELLED | OUTPATIENT
Start: 2024-05-27

## 2024-05-22 RX ORDER — EPINEPHRINE 1 MG/ML
0.3 INJECTION, SOLUTION INTRAMUSCULAR; SUBCUTANEOUS EVERY 5 MIN PRN
Status: CANCELLED | OUTPATIENT
Start: 2024-05-28

## 2024-05-22 RX ORDER — METHYLPREDNISOLONE SODIUM SUCCINATE 125 MG/2ML
125 INJECTION, POWDER, LYOPHILIZED, FOR SOLUTION INTRAMUSCULAR; INTRAVENOUS
Status: CANCELLED
Start: 2024-05-28

## 2024-05-22 RX ORDER — METHOTREXATE 25 MG/ML
25 INJECTION, SOLUTION INTRA-ARTERIAL; INTRAMUSCULAR; INTRAVENOUS ONCE
Status: CANCELLED | OUTPATIENT
Start: 2024-05-27 | End: 2024-05-27

## 2024-05-22 RX ORDER — ALBUTEROL SULFATE 90 UG/1
1-2 AEROSOL, METERED RESPIRATORY (INHALATION)
Status: CANCELLED
Start: 2024-05-31

## 2024-05-22 RX ORDER — ALBUTEROL SULFATE 0.83 MG/ML
2.5 SOLUTION RESPIRATORY (INHALATION)
Status: CANCELLED | OUTPATIENT
Start: 2024-05-30

## 2024-05-22 RX ORDER — DIPHENHYDRAMINE HYDROCHLORIDE 50 MG/ML
50 INJECTION INTRAMUSCULAR; INTRAVENOUS
Status: CANCELLED
Start: 2024-05-28

## 2024-05-22 RX ORDER — ALBUTEROL SULFATE 0.83 MG/ML
2.5 SOLUTION RESPIRATORY (INHALATION)
Status: CANCELLED | OUTPATIENT
Start: 2024-05-31

## 2024-05-22 RX ORDER — EPINEPHRINE 1 MG/ML
0.3 INJECTION, SOLUTION INTRAMUSCULAR; SUBCUTANEOUS EVERY 5 MIN PRN
Status: CANCELLED | OUTPATIENT
Start: 2024-05-31

## 2024-05-22 RX ORDER — ALBUTEROL SULFATE 0.83 MG/ML
2.5 SOLUTION RESPIRATORY (INHALATION)
Status: CANCELLED | OUTPATIENT
Start: 2024-05-27

## 2024-05-22 RX ORDER — METHYLPREDNISOLONE SODIUM SUCCINATE 125 MG/2ML
125 INJECTION, POWDER, LYOPHILIZED, FOR SOLUTION INTRAMUSCULAR; INTRAVENOUS
Status: CANCELLED
Start: 2024-05-29

## 2024-05-22 RX ORDER — EPINEPHRINE 1 MG/ML
0.3 INJECTION, SOLUTION INTRAMUSCULAR; SUBCUTANEOUS EVERY 5 MIN PRN
Status: CANCELLED | OUTPATIENT
Start: 2024-05-29

## 2024-05-22 RX ORDER — ALBUTEROL SULFATE 90 UG/1
1-2 AEROSOL, METERED RESPIRATORY (INHALATION)
Status: CANCELLED
Start: 2024-05-29

## 2024-05-22 RX ORDER — ALBUTEROL SULFATE 90 UG/1
1-2 AEROSOL, METERED RESPIRATORY (INHALATION)
Status: CANCELLED
Start: 2024-05-30

## 2024-05-22 RX ORDER — METHYLPREDNISOLONE SODIUM SUCCINATE 125 MG/2ML
125 INJECTION, POWDER, LYOPHILIZED, FOR SOLUTION INTRAMUSCULAR; INTRAVENOUS
Status: CANCELLED
Start: 2024-05-27

## 2024-05-22 RX ORDER — DIPHENHYDRAMINE HYDROCHLORIDE 50 MG/ML
50 INJECTION INTRAMUSCULAR; INTRAVENOUS
Status: CANCELLED
Start: 2024-05-31

## 2024-05-22 RX ORDER — METHOTREXATE 25 MG/ML
25 INJECTION, SOLUTION INTRA-ARTERIAL; INTRAMUSCULAR; INTRAVENOUS ONCE
Status: CANCELLED | OUTPATIENT
Start: 2024-05-30 | End: 2024-05-30

## 2024-05-22 RX ORDER — ALBUTEROL SULFATE 0.83 MG/ML
2.5 SOLUTION RESPIRATORY (INHALATION)
Status: CANCELLED | OUTPATIENT
Start: 2024-05-28

## 2024-05-22 RX ORDER — DIPHENHYDRAMINE HYDROCHLORIDE 50 MG/ML
50 INJECTION INTRAMUSCULAR; INTRAVENOUS
Status: CANCELLED
Start: 2024-05-29

## 2024-05-22 RX ORDER — MEPERIDINE HYDROCHLORIDE 25 MG/ML
25 INJECTION INTRAMUSCULAR; INTRAVENOUS; SUBCUTANEOUS EVERY 30 MIN PRN
Status: CANCELLED | OUTPATIENT
Start: 2024-05-30

## 2024-05-22 RX ORDER — ALBUTEROL SULFATE 90 UG/1
1-2 AEROSOL, METERED RESPIRATORY (INHALATION)
Status: CANCELLED
Start: 2024-05-27

## 2024-05-22 ASSESSMENT — PAIN SCALES - GENERAL: PAINLEVEL: NO PAIN (0)

## 2024-05-22 NOTE — LETTER
2024         RE: Verito Yee  [address] [facility] [address]        Dear Colleague,    Thank you for referring your patient, Verito Yee, to the [facility]. Please see a copy of my visit note below.    Virtual Visit Details    Type of service:  Video Visit   Video Start Time:  1:30PM  Video End Time: 1:38PM    Originating Location (pt. Location): Home  Distant Location (provider location):  On-site  Platform used for Video Visit: [facility]      Gynecologic Oncology Follow Up Note    RE: Verito Yee   : 1980  PRONOUNS: she/her/hers  JUDY: 2024  GYNECOLOGIC ONCOLOGIST: Carson Sommer MD    CC: Verito Yee is a 43 year old  female with low risk GTN presenting for disease management    INTERVAL HISTORY:  Verito presents virtually for a toxicity check prior to C4 methotrexate for low-risk GTN.     Verito is generally feeling well. She and her partner got  this past weekend.    Tolerated most recent cycle of methotrexate well. Reports one small mouth sore to her lower lip, not impacting ability to eat and drink. She has ordered some Orajel for this. Was not as diligent as normal with salt and soda rinses due to her wedding and being away from home.    She is taking her OCP daily as prescribed. Some slight spotting today.    Denies fevers, chills, shortness of breath, chest pain, abdominal/pelvic pain, bowel concerns, nausea/vomiting, or urinary concerns.      ONCOLOGY HISTORY:     2/15/24 TVUS: nonviable fetal pole measuring 6w0d. Normal bilateral adnexa. The patient underwent expectant management.      2 weeks later she began having heavy bleeding and cramping.  This lasted 2 weeks.      : HCG 7180  :    3/11:   3/19:      3/21/24 TVUS: Retroverted uterus, endometrium with heterogenous material with vascularity. Normal bilateral ovaries, no free fluid.      3/22:      3/26: D&C              Path:  Endometrium with gestational type changes and previous implantation site, no chorionic villi identified.      4/2:     4/8/24: Pelvic US  UTERUS: 7.7 x 4.2 x 5.3 cm. Normal in size and position with no masses.     ENDOMETRIUM: 6 mm. No focal endometrial lesion can be seen. No endometrial region fluid collection  identified.    4/9/24:     4/9/24: CT CAP impression  IMPRESSION:  Unremarkable CT of the chest abdomen and pelvis without evidence of  neoplastic process    4/15/24: C1 methotrexate 25mg IM days 1-5, beta hCG 233  4/29/24: C2 methotrexate 25mg IM days 1-5, beta hCG 5  5/13/24: C3 methotrexate 25mg IM days 1-5, beta hCG <3  5/27/24: Plan for C4 methotrexate 25mg IM days 1-5, beta hCG pending    Past Medical History:   Diagnosis Date     Gestational trophoblastic disease       Past Surgical History:   Procedure Laterality Date     DILATION AND CURETTAGE       TONSILLECTOMY       Social History     Socioeconomic History     Marital status: Single     Spouse name: None     Number of children: None     Years of education: None     Highest education level: None   Tobacco Use     Smoking status: Never     Passive exposure: Never     Smokeless tobacco: Never   Vaping Use     Vaping status: Never Used   Substance and Sexual Activity     Alcohol use: Yes     Comment: 6 drinks per week     Drug use: Never     Sexual activity: Yes     Partners: Male      Family History   Problem Relation Age of Onset     Heart Disease Father       Current Outpatient Medications   Medication Sig Dispense Refill     norethindrone-ethinyl estradiol (MICROGESTIN 1/20) 1-20 MG-MCG tablet Take 1 tablet by mouth daily 30 tablet 2     ondansetron (ZOFRAN) 4 MG tablet Take 1-2 tablets (4-8 mg) by mouth every 6 hours as needed for nausea (Patient not taking: Reported on 5/16/2024) 30 tablet 1     No current facility-administered medications for this visit.      No Known Allergies       OBJECTIVE:    PHYSICAL EXAM:  No vitals taken-  virtual visit  Constitutional: Alert and oriented non-toxic appearing female in no acute distress  HEENT: No periorbital edema or perioral cyanosis  Resp: Respirations unlabored, speaking in full sentences without apparent dyspnea, wheezing, or cough  Psych: Pleasant and interactive, affect euthymic, makes appropriate eye contact, answers questions appropriately, thought content logical      DATA:    Weight trend:  Wt Readings from Last 5 Encounters:   05/22/24 68 kg (150 lb)   05/13/24 68 kg (150 lb)   04/29/24 70.5 kg (155 lb 8 oz)   04/26/24 68 kg (150 lb)   04/15/24 70.8 kg (156 lb 1.6 oz)        Labs:  CBC, CMP, and beta hCG are pending        ASSESSMENT/PLAN:    1) 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 Sommer: methotrexate 0.4mg/kg (25mg max dose) IM days 1-5 on 14 day cycle with plan to treat for two additional cycles after beta hCG <5- reviewed with Dr. Sommer, this fourth cycle will be her last as long as beta hCG remains <5. No dose limiting toxicities, labs are pending and will be reviewed prior to treatment. Proceed with cycle four of methotrexate as planned on 5/27/24 pending results of labs, which will be reviewed and interpreted prior to therapy. Continue OCP daily. Will discuss follow up plan upon completion of therapy with Dr. Sommer and reach out to confirm follow up plan with Verito once this is finalized. Reviewed alarm signs and symptoms and when to seek further care.     2) Treatment/disease related effects:  Stomatitis: One small sore, not impacting ability to eat or drink. Continue salt and soda. She plans on trying Orajel.      Patient verbalized understanding of and agreement with plan    MDM:  20 minutes spent on date of service on visit, including chart review, face to face visit, documentation, and coordination of care.    ARTIE Sinclair, CNP (she/her)  Division of Gynecologic Oncology        Again, thank you for allowing me to participate in the  care of your patient.        Sincerely,        ARTIE Sinclair CNP

## 2024-05-22 NOTE — PROGRESS NOTES
Virtual Visit Details    Type of service:  Video Visit   Video Start Time:  1:30PM  Video End Time: 1:38PM    Originating Location (pt. Location): Home  Distant Location (provider location):  On-site  Platform used for Video Visit: [facility]      Gynecologic Oncology Follow Up Note    RE: Verito Yee   : 1980  PRONOUNS: she/her/hers  JUDY: 2024  GYNECOLOGIC ONCOLOGIST: Carson Sommer MD    CC: Verito Yee is a 43 year old  female with low risk GTN presenting for disease management    INTERVAL HISTORY:  Verito presents virtually for a toxicity check prior to C4 methotrexate for low-risk GTN.     Verito is generally feeling well. She and her partner got  this past weekend.    Tolerated most recent cycle of methotrexate well. Reports one small mouth sore to her lower lip, not impacting ability to eat and drink. She has ordered some Orajel for this. Was not as diligent as normal with salt and soda rinses due to her wedding and being away from home.    She is taking her OCP daily as prescribed. Some slight spotting today.    Denies fevers, chills, shortness of breath, chest pain, abdominal/pelvic pain, bowel concerns, nausea/vomiting, or urinary concerns.      ONCOLOGY HISTORY:     2/15/24 TVUS: nonviable fetal pole measuring 6w0d. Normal bilateral adnexa. The patient underwent expectant management.      2 weeks later she began having heavy bleeding and cramping.  This lasted 2 weeks.      : HCG 7180  :    3/11:   3/19:      3/21/24 TVUS: Retroverted uterus, endometrium with heterogenous material with vascularity. Normal bilateral ovaries, no free fluid.      3/22:      3/26: D&C              Path: Endometrium with gestational type changes and previous implantation site, no chorionic villi identified.      :     24: Pelvic US  UTERUS: 7.7 x 4.2 x 5.3 cm. Normal in size and position with no masses.     ENDOMETRIUM: 6 mm. No focal endometrial  lesion can be seen. No endometrial region fluid collection  identified.    4/9/24:     4/9/24: CT CAP impression  IMPRESSION:  Unremarkable CT of the chest abdomen and pelvis without evidence of  neoplastic process    4/15/24: C1 methotrexate 25mg IM days 1-5, beta hCG 233  4/29/24: C2 methotrexate 25mg IM days 1-5, beta hCG 5  5/13/24: C3 methotrexate 25mg IM days 1-5, beta hCG <3  5/27/24: Plan for C4 methotrexate 25mg IM days 1-5, beta hCG pending    Past Medical History:   Diagnosis Date    Gestational trophoblastic disease       Past Surgical History:   Procedure Laterality Date    DILATION AND CURETTAGE      TONSILLECTOMY       Social History     Socioeconomic History    Marital status: Single     Spouse name: None    Number of children: None    Years of education: None    Highest education level: None   Tobacco Use    Smoking status: Never     Passive exposure: Never    Smokeless tobacco: Never   Vaping Use    Vaping status: Never Used   Substance and Sexual Activity    Alcohol use: Yes     Comment: 6 drinks per week    Drug use: Never    Sexual activity: Yes     Partners: Male      Family History   Problem Relation Age of Onset    Heart Disease Father       Current Outpatient Medications   Medication Sig Dispense Refill    norethindrone-ethinyl estradiol (MICROGESTIN 1/20) 1-20 MG-MCG tablet Take 1 tablet by mouth daily 30 tablet 2    ondansetron (ZOFRAN) 4 MG tablet Take 1-2 tablets (4-8 mg) by mouth every 6 hours as needed for nausea (Patient not taking: Reported on 5/16/2024) 30 tablet 1     No current facility-administered medications for this visit.      No Known Allergies       OBJECTIVE:    PHYSICAL EXAM:  No vitals taken- virtual visit  Constitutional: Alert and oriented non-toxic appearing female in no acute distress  HEENT: No periorbital edema or perioral cyanosis  Resp: Respirations unlabored, speaking in full sentences without apparent dyspnea, wheezing, or cough  Psych: Pleasant and  interactive, affect euthymic, makes appropriate eye contact, answers questions appropriately, thought content logical      DATA:    Weight trend:  Wt Readings from Last 5 Encounters:   05/22/24 68 kg (150 lb)   05/13/24 68 kg (150 lb)   04/29/24 70.5 kg (155 lb 8 oz)   04/26/24 68 kg (150 lb)   04/15/24 70.8 kg (156 lb 1.6 oz)        Labs:  CBC, CMP, and beta hCG are pending        ASSESSMENT/PLAN:    1) 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 Sommer: methotrexate 0.4mg/kg (25mg max dose) IM days 1-5 on 14 day cycle with plan to treat for two additional cycles after beta hCG <5- reviewed with Dr. Sommer, this fourth cycle will be her last as long as beta hCG remains <5. No dose limiting toxicities, labs are pending and will be reviewed prior to treatment. Proceed with cycle four of methotrexate as planned on 5/27/24 pending results of labs, which will be reviewed and interpreted prior to therapy. Continue OCP daily. Will discuss follow up plan upon completion of therapy with Dr. Sommer and reach out to confirm follow up plan with Verito once this is finalized. Reviewed alarm signs and symptoms and when to seek further care.     2) Treatment/disease related effects:  Stomatitis: One small sore, not impacting ability to eat or drink. Continue salt and soda. She plans on trying Orajel.      Patient verbalized understanding of and agreement with plan    MDM:  20 minutes spent on date of service on visit, including chart review, face to face visit, documentation, and coordination of care.    ARTIE Sinclair, CNP (she/her)  Division of Gynecologic Oncology

## 2024-05-22 NOTE — NURSING NOTE
Is the patient currently in the state of [place]? YES    Visit mode:VIDEO    If the visit is dropped, the patient can be reconnected by: VIDEO VISIT: Text to cell phone:   Telephone Information:   Mobile [phone]       Will anyone else be joining the visit? NO  (If patient encounters technical issues they should call [phone]    How would you like to obtain your AVS? MyChart    Are changes needed to the allergy or medication list? Pt stated no med changes    Are refills needed on medications prescribed by this physician? NO    Reason for visit: RECHECK    No other vitals to report per pt    Susy MACHUCAF

## 2024-05-23 ENCOUNTER — PATIENT OUTREACH (OUTPATIENT)
Dept: ONCOLOGY | Facility: CLINIC | Age: 44
End: 2024-05-23
Payer: COMMERCIAL

## 2024-05-23 NOTE — PROGRESS NOTES
[facility]: Cancer Care                                                                                          Call placed to patient to ask if she can come to [place] on 5/24/24 for C4D1 lab work prior to Methotrexate on 5/27/24. Patient agreeable and a lab appointment was made on 5/24/24 at 1015.  [facility] approved having labs drawn a little earlier.    Signature:  Angela Raza RN

## 2024-05-24 ENCOUNTER — LAB (OUTPATIENT)
Dept: INFUSION THERAPY | Facility: CLINIC | Age: 44
End: 2024-05-24
Attending: OBSTETRICS & GYNECOLOGY
Payer: COMMERCIAL

## 2024-05-24 DIAGNOSIS — O01.9 GESTATIONAL TROPHOBLASTIC NEOPLASM: Primary | ICD-10-CM

## 2024-05-24 LAB
ALBUMIN SERPL BCG-MCNC: 4.5 G/DL (ref 3.5–5.2)
ALP SERPL-CCNC: 53 U/L (ref 40–150)
ALT SERPL W P-5'-P-CCNC: 15 U/L (ref 0–50)
ANION GAP SERPL CALCULATED.3IONS-SCNC: 10 MMOL/L (ref 7–15)
AST SERPL W P-5'-P-CCNC: 17 U/L (ref 0–45)
BASOPHILS # BLD AUTO: 0 10E3/UL (ref 0–0.2)
BASOPHILS NFR BLD AUTO: 1 %
BILIRUB SERPL-MCNC: 0.2 MG/DL
BUN SERPL-MCNC: 6.3 MG/DL (ref 6–20)
CALCIUM SERPL-MCNC: 9.4 MG/DL (ref 8.6–10)
CHLORIDE SERPL-SCNC: 106 MMOL/L (ref 98–107)
CREAT SERPL-MCNC: 0.7 MG/DL (ref 0.51–0.95)
DEPRECATED HCO3 PLAS-SCNC: 23 MMOL/L (ref 22–29)
EGFRCR SERPLBLD CKD-EPI 2021: >90 ML/MIN/1.73M2
EOSINOPHIL # BLD AUTO: 0.1 10E3/UL (ref 0–0.7)
EOSINOPHIL NFR BLD AUTO: 1 %
ERYTHROCYTE [DISTWIDTH] IN BLOOD BY AUTOMATED COUNT: 13.9 % (ref 10–15)
GLUCOSE SERPL-MCNC: 114 MG/DL (ref 70–99)
HCT VFR BLD AUTO: 38 % (ref 35–47)
HGB BLD-MCNC: 12.4 G/DL (ref 11.7–15.7)
IMM GRANULOCYTES # BLD: 0 10E3/UL
IMM GRANULOCYTES NFR BLD: 0 %
LYMPHOCYTES # BLD AUTO: 1.7 10E3/UL (ref 0.8–5.3)
LYMPHOCYTES NFR BLD AUTO: 37 %
MCH RBC QN AUTO: 29 PG (ref 26.5–33)
MCHC RBC AUTO-ENTMCNC: 32.6 G/DL (ref 31.5–36.5)
MCV RBC AUTO: 89 FL (ref 78–100)
MONOCYTES # BLD AUTO: 0.3 10E3/UL (ref 0–1.3)
MONOCYTES NFR BLD AUTO: 6 %
NEUTROPHILS # BLD AUTO: 2.6 10E3/UL (ref 1.6–8.3)
NEUTROPHILS NFR BLD AUTO: 55 %
NRBC # BLD AUTO: 0 10E3/UL
NRBC BLD AUTO-RTO: 0 /100
PLATELET # BLD AUTO: 277 10E3/UL (ref 150–450)
POTASSIUM SERPL-SCNC: 4.1 MMOL/L (ref 3.4–5.3)
PROT SERPL-MCNC: 7.6 G/DL (ref 6.4–8.3)
RBC # BLD AUTO: 4.27 10E6/UL (ref 3.8–5.2)
SODIUM SERPL-SCNC: 139 MMOL/L (ref 135–145)
WBC # BLD AUTO: 4.7 10E3/UL (ref 4–11)

## 2024-05-24 PROCEDURE — 82040 ASSAY OF SERUM ALBUMIN: CPT | Performed by: NURSE PRACTITIONER

## 2024-05-24 PROCEDURE — 85049 AUTOMATED PLATELET COUNT: CPT | Performed by: NURSE PRACTITIONER

## 2024-05-24 PROCEDURE — 84702 CHORIONIC GONADOTROPIN TEST: CPT | Performed by: NURSE PRACTITIONER

## 2024-05-24 PROCEDURE — 36415 COLL VENOUS BLD VENIPUNCTURE: CPT | Performed by: NURSE PRACTITIONER

## 2024-05-27 ENCOUNTER — INFUSION THERAPY VISIT (OUTPATIENT)
Dept: ONCOLOGY | Facility: CLINIC | Age: 44
End: 2024-05-27
Attending: NURSE PRACTITIONER
Payer: COMMERCIAL

## 2024-05-27 VITALS
OXYGEN SATURATION: 98 % | RESPIRATION RATE: 16 BRPM | SYSTOLIC BLOOD PRESSURE: 119 MMHG | TEMPERATURE: 97.7 F | DIASTOLIC BLOOD PRESSURE: 79 MMHG | HEART RATE: 84 BPM

## 2024-05-27 DIAGNOSIS — O01.9 GESTATIONAL TROPHOBLASTIC NEOPLASM: Primary | ICD-10-CM

## 2024-05-27 PROCEDURE — 250N000011 HC RX IP 250 OP 636: Performed by: NURSE PRACTITIONER

## 2024-05-27 PROCEDURE — 96401 CHEMO ANTI-NEOPL SQ/IM: CPT

## 2024-05-27 RX ORDER — METHOTREXATE 25 MG/ML
25 INJECTION, SOLUTION INTRA-ARTERIAL; INTRAMUSCULAR; INTRAVENOUS ONCE
Status: COMPLETED | OUTPATIENT
Start: 2024-05-27 | End: 2024-05-27

## 2024-05-27 RX ADMIN — METHOTREXATE 25 MG: 25 INJECTION, SOLUTION INTRA-ARTERIAL; INTRAMUSCULAR; INTRAVENOUS at 10:44

## 2024-05-27 NOTE — PATIENT INSTRUCTIONS
[facility] Triage and after hours / weekends / holidays:  [phone]    Please call the triage or after hours line if you experience a temperature greater than or equal to 100.4, shaking chills, have uncontrolled nausea, vomiting and/or diarrhea, dizziness, shortness of breath, chest pain, bleeding, unexplained bruising, or if you have any other new/concerning symptoms, questions or concerns.      If you are having any concerning symptoms or wish to speak to a provider before your next infusion visit, please call triage to notify them so we can adequately serve you.     If you need a refill on a narcotic prescription or other medication, please call before your infusion appointment.                May 2024      [facility] Monday Tuesday Wednesday Thursday Friday Saturday                  1    INFUSION 1 HR (60 MIN)   9:15 AM   (60 min.)   MG BAY 9 INFUSION   [facility] 2    INFUSION 1 HR (60 MIN)   9:15 AM   (60 min.)   MG BAY 8 INFUSION   [facility] 3    INFUSION 1 HR (60 MIN)   8:45 AM   (60 min.)   MG BAY 5 INFUSION   [facility] 4       5     6     7     8     9     10     11       12     13    LAB PERIPHERAL  11:30 AM   (15 min.)   MG CANCER PIV LAB   [facility]    RETURN CCSL  12:00 PM   (30 min.)   Carson Sommer MD   [facility]    INFUSION 1 HR (60 MIN)  12:45 PM   (60 min.)   MG BAY 9 INFUSION   [facility] 14    INFUSION 1 HR (60 MIN)   1:15 PM   (60 min.)   MG BAY 10 INFUSION   [facility] 15    INFUSION 1 HR (60 MIN)   1:15 PM   (60 min.)   MG BAY 98 INFUSION   [facility] 16    INFUSION 1 HR (60 MIN)   1:45 PM   (60 min.)   MG BAY 10 INFUSION   [facility] 17    INFUSION 1 HR (60 MIN)   1:15 PM   (60 min.)   MG BAY 6  INFUSION   [facility] 18       19     20     21     22    RETURN CCSL   1:15 PM   (60 min.)   Malena Rasheed APRN CNP   [facility] 23     24    LAB PERIPHERAL  10:00 AM   (15 min.)   MG CANCER PIV LAB   [facility] 25       26     27    ONC INFUSION 1 HR (60 MIN)  10:00 AM   (60 min.)    ONC INFUSION NURSE   [facility] 28    INFUSION 1 HR (60 MIN)   2:45 PM   (60 min.)   MG BAY 8 INFUSION   [facility] 29    INFUSION 1 HR (60 MIN)   3:15 PM   (60 min.)   MG BAY 5 INFUSION   [facility] 30    INFUSION 1 HR (60 MIN)   3:45 PM   (60 min.)   MG BAY 2 INFUSION   [facility] 31    INFUSION 1 HR (60 MIN)   2:45 PM   (60 min.)   MG BAY 8 INFUSION   [facility]                  June 2024 Sunday Monday Tuesday Wednesday Thursday Friday Saturday                                 1       2     3     4     5     6     7     8       9     10    RETURN CCSL   8:45 AM   (60 min.)   Malena Rasheed APRN CNP   [facility]    LAB PERIPHERAL  12:45 PM   (15 min.)   MG CANCER PIV LAB   [facility]    INFUSION 1 HR (60 MIN)   1:15 PM   (60 min.)   MG BAY 9 INFUSION   [facility] 11    INFUSION 1 HR (60 MIN)   1:45 PM   (60 min.)   MG BAY 6 INFUSION   [facility] 12    INFUSION 1 HR (60 MIN)   1:15 PM   (60 min.)   MG BAY 9 INFUSION   [facility] 13    INFUSION 1 HR (60 MIN)   2:45 PM   (60 min.)   MG BAY 10 INFUSION   [facility] 14    INFUSION 1 HR (60 MIN)   2:15 PM   (60 min.)   MG BAY 9 INFUSION   [facility] 15       16     17     18     19     20      21     22       23     24     25     26     27     28     29       30                                                   Lab Results:  No results found for this or any previous visit (from the past 12 hour(s)).

## 2024-05-27 NOTE — PROGRESS NOTES
Infusion Nursing Note:  Verito Yee presents today for D1C4 Methotrexate IM .    Patient seen by provider today: No   present during visit today: Not Applicable.    Note: Verito said she has been doing well. Denies fever, cough or SOB.  No nausea or abd cramps.  Some fatigue and eating well.        Intravenous Access:  No Intravenous access/labs at this visit.    Treatment Conditions:  Lab Results   Component Value Date    HGB 12.4 05/24/2024    WBC 4.7 05/24/2024    ANEUTAUTO 2.6 05/24/2024     05/24/2024        Lab Results   Component Value Date     05/24/2024    POTASSIUM 4.1 05/24/2024    CR 0.70 05/24/2024    PER 9.4 05/24/2024    BILITOTAL 0.2 05/24/2024    ALBUMIN 4.5 05/24/2024    ALT 15 05/24/2024    AST 17 05/24/2024       Results reviewed, labs MET treatment parameters, ok to proceed with treatment.      Post Infusion Assessment:  Patient tolerated injection in left deltoid without incident.       Discharge Plan:   Patient declined prescription refills.  Discharge instructions reviewed with: Patient and Family.  Patient and/or family verbalized understanding of discharge instructions and all questions answered.  AVS to patient via [employer].  Patient will return 5/28 for next infusion and 6/10 for next provider appointment.   Patient discharged in stable condition accompanied by: self and .  Departure Mode: Ambulatory.      Leanne Ferro RN

## 2024-05-28 ENCOUNTER — INFUSION THERAPY VISIT (OUTPATIENT)
Dept: INFUSION THERAPY | Facility: CLINIC | Age: 44
End: 2024-05-28
Attending: NURSE PRACTITIONER
Payer: COMMERCIAL

## 2024-05-28 VITALS
RESPIRATION RATE: 16 BRPM | OXYGEN SATURATION: 97 % | SYSTOLIC BLOOD PRESSURE: 140 MMHG | DIASTOLIC BLOOD PRESSURE: 93 MMHG | HEART RATE: 78 BPM

## 2024-05-28 DIAGNOSIS — O01.9 GESTATIONAL TROPHOBLASTIC NEOPLASM: Primary | ICD-10-CM

## 2024-05-28 LAB — HCG-TM SERPL-ACNC: <3 IU/L

## 2024-05-28 PROCEDURE — 99207 PR NO CHARGE LOS: CPT

## 2024-05-28 PROCEDURE — 96401 CHEMO ANTI-NEOPL SQ/IM: CPT

## 2024-05-28 PROCEDURE — 250N000011 HC RX IP 250 OP 636: Performed by: NURSE PRACTITIONER

## 2024-05-28 RX ORDER — METHOTREXATE 25 MG/ML
25 INJECTION, SOLUTION INTRA-ARTERIAL; INTRAMUSCULAR; INTRAVENOUS ONCE
Status: COMPLETED | OUTPATIENT
Start: 2024-05-28 | End: 2024-05-28

## 2024-05-28 RX ADMIN — METHOTREXATE 25 MG: 25 SOLUTION INTRA-ARTERIAL; INTRAMUSCULAR; INTRATHECAL; INTRAVENOUS at 15:18

## 2024-05-28 ASSESSMENT — PAIN SCALES - GENERAL: PAINLEVEL: NO PAIN (0)

## 2024-05-28 NOTE — PROGRESS NOTES
Infusion Nursing Note:  Verito Yee presents today for C4D2 Methotrexate.    Patient seen by provider today: No   present during visit today: Not Applicable.    Note: No changes overnight.    Intravenous Access:  No Intravenous access/labs at this visit.    Treatment Conditions:  Not Applicable.    Post Infusion Assessment:  Patient tolerated injection without incident.     Discharge Plan:   Patient will return 5/29/2024 for next appointment.   Future appts have been reviewed and crosschecked with appt note and plan.  Patient discharged in stable condition accompanied by: .  Departure Mode: Ambulatory.    Domitila Matos, RN-BSN, PHN, OCN  [facility]

## 2024-05-29 ENCOUNTER — INFUSION THERAPY VISIT (OUTPATIENT)
Dept: INFUSION THERAPY | Facility: CLINIC | Age: 44
End: 2024-05-29
Attending: OBSTETRICS & GYNECOLOGY
Payer: COMMERCIAL

## 2024-05-29 VITALS
RESPIRATION RATE: 16 BRPM | HEART RATE: 92 BPM | SYSTOLIC BLOOD PRESSURE: 131 MMHG | DIASTOLIC BLOOD PRESSURE: 83 MMHG | OXYGEN SATURATION: 97 %

## 2024-05-29 DIAGNOSIS — O01.9 GESTATIONAL TROPHOBLASTIC NEOPLASM: Primary | ICD-10-CM

## 2024-05-29 DIAGNOSIS — R79.89 ELEVATED SERUM HCG: ICD-10-CM

## 2024-05-29 PROCEDURE — 99207 PR NO CHARGE LOS: CPT

## 2024-05-29 PROCEDURE — 96401 CHEMO ANTI-NEOPL SQ/IM: CPT

## 2024-05-29 PROCEDURE — 250N000011 HC RX IP 250 OP 636: Performed by: NURSE PRACTITIONER

## 2024-05-29 RX ORDER — METHOTREXATE 25 MG/ML
25 INJECTION, SOLUTION INTRA-ARTERIAL; INTRAMUSCULAR; INTRAVENOUS ONCE
Status: COMPLETED | OUTPATIENT
Start: 2024-05-29 | End: 2024-05-29

## 2024-05-29 RX ADMIN — METHOTREXATE 25 MG: 25 SOLUTION INTRA-ARTERIAL; INTRAMUSCULAR; INTRATHECAL; INTRAVENOUS at 15:51

## 2024-05-29 ASSESSMENT — PAIN SCALES - GENERAL: PAINLEVEL: NO PAIN (0)

## 2024-05-29 NOTE — TELEPHONE ENCOUNTER
SUBJECTIVE/OBJECTIVE:                                                    Refill request receive for:  microgestin    Last refill per fax: not listed  Date of LOV: 5/22/24  Future appt scheduled? Yes: 7/1/24   Date faxed to clinic: 5/28/24    PLAN:                                                      Sent to provider to advise.

## 2024-05-29 NOTE — PROGRESS NOTES
Infusion Nursing Note:  Verito Yee presents today for C4D3 Methotrexate.    Patient seen by provider today: No   present during visit today: Not Applicable.    Note: Patient reports no changes overnight.    Intravenous Access:  No Intravenous access/labs at this visit.    Treatment Conditions:  Not Applicable.    Post Infusion Assessment:  Patient tolerated injection without incident.     Discharge Plan:   Patient will return 5/30/2024 for next appointment.   Future appts have been reviewed and crosschecked with appt note and plan.  Patient discharged in stable condition accompanied by: .  Departure Mode: Ambulatory.    Domitila Matos, RN-BSN, PHN, OCN  [facility]

## 2024-05-30 ENCOUNTER — INFUSION THERAPY VISIT (OUTPATIENT)
Dept: INFUSION THERAPY | Facility: CLINIC | Age: 44
End: 2024-05-30
Attending: NURSE PRACTITIONER
Payer: COMMERCIAL

## 2024-05-30 VITALS
TEMPERATURE: 97.2 F | OXYGEN SATURATION: 100 % | HEART RATE: 74 BPM | SYSTOLIC BLOOD PRESSURE: 150 MMHG | DIASTOLIC BLOOD PRESSURE: 103 MMHG

## 2024-05-30 DIAGNOSIS — O01.9 GESTATIONAL TROPHOBLASTIC NEOPLASM: Primary | ICD-10-CM

## 2024-05-30 PROCEDURE — 99207 PR NO CHARGE LOS: CPT

## 2024-05-30 PROCEDURE — 96401 CHEMO ANTI-NEOPL SQ/IM: CPT

## 2024-05-30 PROCEDURE — 250N000011 HC RX IP 250 OP 636: Performed by: NURSE PRACTITIONER

## 2024-05-30 RX ORDER — NORETHINDRONE ACETATE AND ETHINYL ESTRADIOL .02; 1 MG/1; MG/1
1 TABLET ORAL DAILY
Qty: 84 TABLET | Refills: 2 | Status: SHIPPED | OUTPATIENT
Start: 2024-05-30

## 2024-05-30 RX ORDER — METHOTREXATE 25 MG/ML
25 INJECTION, SOLUTION INTRA-ARTERIAL; INTRAMUSCULAR; INTRAVENOUS ONCE
Status: COMPLETED | OUTPATIENT
Start: 2024-05-30 | End: 2024-05-30

## 2024-05-30 RX ADMIN — METHOTREXATE 25 MG: 25 SOLUTION INTRA-ARTERIAL; INTRAMUSCULAR; INTRATHECAL; INTRAVENOUS at 16:17

## 2024-05-30 ASSESSMENT — PAIN SCALES - GENERAL: PAINLEVEL: MILD PAIN (2)

## 2024-05-30 NOTE — PROGRESS NOTES
"Infusion Nursing Note:  Verito Yee presents today for C4D4 Methotrexate.    Patient seen by provider today: No   present during visit today: Not Applicable.    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 See flow sheet for assessment.       Intravenous Access:  No Intravenous access/labs at this visit.    Treatment Conditions:  Not Applicable.      Post Infusion Assessment:  Patient tolerated injection without incident.  Site patent and intact, free from redness, edema or discomfort.       Discharge Plan:   Patient discharged in stable condition accompanied by: .  Departure Mode: Ambulatory.  Pt will RTC 5/31/24 for C4D5 Methotrexate. Appts verified and pt aware.      Ajay Mcallister RN  "

## 2024-05-31 ENCOUNTER — INFUSION THERAPY VISIT (OUTPATIENT)
Dept: INFUSION THERAPY | Facility: CLINIC | Age: 44
End: 2024-05-31
Attending: NURSE PRACTITIONER
Payer: COMMERCIAL

## 2024-05-31 VITALS
SYSTOLIC BLOOD PRESSURE: 132 MMHG | OXYGEN SATURATION: 98 % | HEART RATE: 83 BPM | RESPIRATION RATE: 16 BRPM | TEMPERATURE: 97.9 F | DIASTOLIC BLOOD PRESSURE: 88 MMHG

## 2024-05-31 DIAGNOSIS — O01.9 GESTATIONAL TROPHOBLASTIC NEOPLASM: Primary | ICD-10-CM

## 2024-05-31 PROCEDURE — 99207 PR NO CHARGE LOS: CPT

## 2024-05-31 PROCEDURE — 250N000011 HC RX IP 250 OP 636: Performed by: NURSE PRACTITIONER

## 2024-05-31 PROCEDURE — 96401 CHEMO ANTI-NEOPL SQ/IM: CPT

## 2024-05-31 RX ORDER — METHOTREXATE 25 MG/ML
25 INJECTION, SOLUTION INTRA-ARTERIAL; INTRAMUSCULAR; INTRAVENOUS ONCE
Status: COMPLETED | OUTPATIENT
Start: 2024-05-31 | End: 2024-05-31

## 2024-05-31 RX ADMIN — METHOTREXATE 25 MG: 25 SOLUTION INTRA-ARTERIAL; INTRAMUSCULAR; INTRATHECAL; INTRAVENOUS at 15:18

## 2024-05-31 NOTE — PROGRESS NOTES
Infusion Nursing Note:  Verito Yee presents today for C4D5 Methotrexate Injecction.    Patient seen by provider today: No   present during visit today: Not Applicable.    Note: Patient reports last night was tough-states she had chills, body aches. Denies fevers or taking any medications to intervene and it resolved on its own. Today she states she is physically feeling better and is happy today is her last injection.    Intravenous Access:  No Intravenous access/labs at this visit.    Treatment Conditions:  Not Applicable.    Post Infusion Assessment:  Patient tolerated injection without incident.     Discharge Plan:   Future appts have been reviewed and crosschecked with appt note and plan.  AVS to patient via INMANT.  Patient will return at MD direction for next appointment.   Patient discharged in stable condition accompanied by: .  Departure Mode: Ambulatory.      Janey Thomason RN BSN OCN

## 2024-06-03 DIAGNOSIS — O01.9 GESTATIONAL TROPHOBLASTIC NEOPLASM: Primary | ICD-10-CM

## 2024-06-26 NOTE — PROGRESS NOTES
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     Verito is overall doing very well.  She reports that her 2 weeks after her last cycle of chemotherapy were tough due to fatigue.  She reports this is significantly improving.  She denies any mouth sores anymore.  She reports normal appetite.  She reports normal bowel movements and urination.  She has been having regular monthly menstrual cycles which occur on her placebo week of her OCP cycle.  She denies any vaginal bleeding in between.      ONCOLOGY HISTORY:     2/15/24 TVUS: nonviable fetal pole measuring 6w0d. Normal bilateral adnexa. The patient underwent expectant management.      2 weeks later she began having heavy bleeding and cramping.  This lasted 2 weeks.      : HCG 7180  :    3/11:   3/19:      3/21/24 TVUS: Retroverted uterus, endometrium with heterogenous material with vascularity. Normal bilateral ovaries, no free fluid.      3/22:      3/26: D&C              Path: Endometrium with gestational type changes and previous implantation site, no chorionic villi identified.      :     24: Pelvic US  UTERUS: 7.7 x 4.2 x 5.3 cm. Normal in size and position with no masses.     ENDOMETRIUM: 6 mm. No focal endometrial lesion can be seen. No endometrial region fluid collection identified.    24:     24: CT CAP impression  IMPRESSION:  Unremarkable CT of the chest abdomen and pelvis without evidence of  neoplastic process    4/15/24: C1 methotrexate 25mg IM days 1-5, beta hCG 233  24: C2 methotrexate 25mg IM days 1-5, beta hCG 5  2024: C3 methotrexate 25mg IM days 1-5, beta hCG  <3  2024: C4 methotrexate 25mg IM days 1-5, beta hCG <3    24 HCG <3    Past Medical History:   Diagnosis Date    Gestational trophoblastic disease         Past Surgical History:   Procedure Laterality Date    DILATION AND CURETTAGE      TONSILLECTOMY       Social History     Socioeconomic History    Marital status: Single     Spouse name: None    Number of children: None    Years of education: None    Highest education level: None   Tobacco Use    Smoking status: Never     Passive exposure: Never    Smokeless tobacco: Never   Vaping Use    Vaping status: Never Used   Substance and Sexual Activity    Alcohol use: Yes     Comment: 6 drinks per week    Drug use: Never    Sexual activity: Yes     Partners: Male      Family History   Problem Relation Age of Onset    Heart Disease Father       Current Outpatient Medications   Medication Sig Dispense Refill    norethindrone-ethinyl estradiol (MICROGESTIN ) 1-20 MG-MCG tablet Take 1 tablet by mouth daily 84 tablet 2     No current facility-administered medications for this visit.      No Known Allergies       OBJECTIVE:    PHYSICAL EXAM:  /86 (BP Location: Right arm)   Pulse 87   Wt 65 kg (143 lb 3.2 oz)   LMP 2024   SpO2 95%   BMI 24.58 kg/m       Constitutional: Alert and oriented non-toxic appearing female in no acute distress  HEENT: No periorbital edema or perioral cyanosis  Resp: Respirations unlabored, speaking in full sentences without apparent dyspnea, wheezing, or cough  Psych: Pleasant and interactive, affect euthymic, makes appropriate eye contact, answers questions appropriately, thought content logical  Abdomen: Soft, nontender to palpation, no masses  : Normal external genitalia.  Normal vaginal mucosa and cervix.  Bimanual exam without masses or nodularity.  Uterus palpates mobile and normal in size.    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 Currently  undergoing surveillance with [facility] on exam today and negative hCG. Continue OCP daily.  Reviewed  signs and symptoms and when to seek further care.  I will plan to follow-up with the patient in person in 6 months.  We will continue monthly hCG levels for 1 year.   -Continue monthly hCG levels for 1 year   -Follow-up with me in 6 months      Carson Sommer MD

## 2024-06-28 ENCOUNTER — LAB (OUTPATIENT)
Dept: INFUSION THERAPY | Facility: CLINIC | Age: 44
End: 2024-06-28
Attending: OBSTETRICS & GYNECOLOGY
Payer: COMMERCIAL

## 2024-06-28 DIAGNOSIS — O01.9 GESTATIONAL TROPHOBLASTIC NEOPLASM: ICD-10-CM

## 2024-06-28 PROCEDURE — 84702 CHORIONIC GONADOTROPIN TEST: CPT

## 2024-06-28 PROCEDURE — 36415 COLL VENOUS BLD VENIPUNCTURE: CPT

## 2024-07-01 ENCOUNTER — ONCOLOGY VISIT (OUTPATIENT)
Dept: ONCOLOGY | Facility: CLINIC | Age: 44
End: 2024-07-01
Attending: OBSTETRICS & GYNECOLOGY
Payer: COMMERCIAL

## 2024-07-01 VITALS
OXYGEN SATURATION: 95 % | DIASTOLIC BLOOD PRESSURE: 86 MMHG | SYSTOLIC BLOOD PRESSURE: 133 MMHG | WEIGHT: 143.2 LBS | BODY MASS INDEX: 24.58 KG/M2 | HEART RATE: 87 BPM

## 2024-07-01 DIAGNOSIS — O01.9 GESTATIONAL TROPHOBLASTIC NEOPLASM: Primary | ICD-10-CM

## 2024-07-01 LAB — HCG-TM SERPL-ACNC: <3 IU/L

## 2024-07-01 PROCEDURE — 99213 OFFICE O/P EST LOW 20 MIN: CPT | Performed by: OBSTETRICS & GYNECOLOGY

## 2024-07-01 PROCEDURE — G2211 COMPLEX E/M VISIT ADD ON: HCPCS | Performed by: OBSTETRICS & GYNECOLOGY

## 2024-07-01 PROCEDURE — G0463 HOSPITAL OUTPT CLINIC VISIT: HCPCS | Performed by: OBSTETRICS & GYNECOLOGY

## 2024-07-01 ASSESSMENT — PAIN SCALES - GENERAL: PAINLEVEL: NO PAIN (0)

## 2024-07-01 NOTE — LETTER
2024      Verito Yee  [address] [facility] [address]      Dear Colleague,    Thank you for referring your patient, Verito Yee, to the [facility]. Please see a copy of my visit note below.      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     Verito is overall doing very well.  She reports that her 2 weeks after her last cycle of chemotherapy were tough due to fatigue.  She reports this is significantly improving.  She denies any mouth sores anymore.  She reports normal appetite.  She reports normal bowel movements and urination.  She has been having regular monthly menstrual cycles which occur on her placebo week of her OCP cycle.  She denies any vaginal bleeding in between.      ONCOLOGY HISTORY:     2/15/24 TVUS: nonviable fetal pole measuring 6w0d. Normal bilateral adnexa. The patient underwent expectant management.      2 weeks later she began having heavy bleeding and cramping.  This lasted 2 weeks.      : HCG 7180  :    3/11:   3/19:      3/21/24 TVUS: Retroverted uterus, endometrium with heterogenous material with vascularity. Normal bilateral ovaries, no free fluid.      3/22:      3/26: D&C              Path: Endometrium with gestational type changes and previous implantation site, no chorionic villi identified.      :     24: Pelvic US  UTERUS: 7.7 x 4.2 x 5.3 cm. Normal in size and position with no masses.     ENDOMETRIUM: 6 mm. No focal endometrial lesion can be seen. No endometrial region fluid collection identified.    24:     24: CT CAP impression  IMPRESSION:  Unremarkable CT  of the chest abdomen and pelvis without evidence of  neoplastic process    4/15/24: C1 methotrexate 25mg IM days 1-5, beta hCG 233  4/29/24: C2 methotrexate 25mg IM days 1-5, beta hCG 5  5/13/2024: C3 methotrexate 25mg IM days 1-5, beta hCG <3  5/27/2024: C4 methotrexate 25mg IM days 1-5, beta hCG <3    6/28/24 HCG <3    Past Medical History:   Diagnosis Date     Gestational trophoblastic disease         Past Surgical History:   Procedure Laterality Date     DILATION AND CURETTAGE       TONSILLECTOMY       Social History     Socioeconomic History     Marital status: Single     Spouse name: None     Number of children: None     Years of education: None     Highest education level: None   Tobacco Use     Smoking status: Never     Passive exposure: Never     Smokeless tobacco: Never   Vaping Use     Vaping status: Never Used   Substance and Sexual Activity     Alcohol use: Yes     Comment: 6 drinks per week     Drug use: Never     Sexual activity: Yes     Partners: Male      Family History   Problem Relation Age of Onset     Heart Disease Father       Current Outpatient Medications   Medication Sig Dispense Refill     norethindrone-ethinyl estradiol (MICROGESTIN 1/20) 1-20 MG-MCG tablet Take 1 tablet by mouth daily 84 tablet 2     No current facility-administered medications for this visit.      No Known Allergies       OBJECTIVE:    PHYSICAL EXAM:  /86 (BP Location: Right arm)   Pulse 87   Wt 65 kg (143 lb 3.2 oz)   LMP 06/27/2024   SpO2 95%   BMI 24.58 kg/m       Constitutional: Alert and oriented non-toxic appearing female in no acute distress  HEENT: No periorbital edema or perioral cyanosis  Resp: Respirations unlabored, speaking in full sentences without apparent dyspnea, wheezing, or cough  Psych: Pleasant and interactive, affect euthymic, makes appropriate eye contact, answers questions appropriately, thought content logical  Abdomen: Soft, nontender to palpation, no masses  : Normal external  genitalia.  Normal vaginal mucosa and cervix.  Bimanual exam without masses or nodularity.  Uterus palpates mobile and normal in size.    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 Currently undergoing surveillance with [facility] on exam today and negative hCG. Continue OCP daily.  Reviewed  signs and symptoms and when to seek further care.  I will plan to follow-up with the patient in person in 6 months.  We will continue monthly hCG levels for 1 year.   -Continue monthly hCG levels for 1 year   -Follow-up with me in 6 months      Carson Sommer MD       Again, thank you for allowing me to participate in the care of your patient.        Sincerely,        Carson Sommer MD

## 2024-07-01 NOTE — NURSING NOTE
"Oncology Rooming Note    July 1, 2024 12:14 PM   Verito Yee is a 43 year old female who presents for:    Chief Complaint   Patient presents with    Oncology Clinic Visit     Initial Vitals: /86 (BP Location: Right arm)   Pulse 87   Wt 65 kg (143 lb 3.2 oz)   LMP 06/27/2024   SpO2 95%   BMI 24.58 kg/m   Estimated body mass index is 24.58 kg/m  as calculated from the following:    Height as of 5/22/24: 1.626 m (5' 4\").    Weight as of this encounter: 65 kg (143 lb 3.2 oz). Body surface area is 1.71 meters squared.  No Pain (0) Comment: Data Unavailable   Patient's last menstrual period was 06/27/2024.  Allergies reviewed: Yes  Medications reviewed: Yes    Medications: Medication refills not needed today.  Pharmacy name entered into [employer]: [id number] - [place], [place] - [address]    Frailty Screening:   Is the patient here for a new oncology consult visit in cancer care? 2. No      Clinical concerns: No Concerns        Sonam Biggs MA            "

## 2024-07-31 ENCOUNTER — LAB (OUTPATIENT)
Dept: INFUSION THERAPY | Facility: CLINIC | Age: 44
End: 2024-07-31
Attending: OBSTETRICS & GYNECOLOGY
Payer: COMMERCIAL

## 2024-07-31 DIAGNOSIS — O01.9 GESTATIONAL TROPHOBLASTIC NEOPLASM: ICD-10-CM

## 2024-07-31 LAB
HOLD SPECIMEN: NORMAL

## 2024-07-31 PROCEDURE — 36415 COLL VENOUS BLD VENIPUNCTURE: CPT

## 2024-07-31 PROCEDURE — 84702 CHORIONIC GONADOTROPIN TEST: CPT

## 2024-08-01 LAB — HCG-TM SERPL-ACNC: <3 IU/L

## 2024-08-29 ENCOUNTER — LAB (OUTPATIENT)
Dept: INFUSION THERAPY | Facility: CLINIC | Age: 44
End: 2024-08-29
Attending: OBSTETRICS & GYNECOLOGY
Payer: COMMERCIAL

## 2024-08-29 DIAGNOSIS — O01.9 GESTATIONAL TROPHOBLASTIC NEOPLASM: ICD-10-CM

## 2024-08-29 LAB
HOLD SPECIMEN: NORMAL

## 2024-08-29 PROCEDURE — 36415 COLL VENOUS BLD VENIPUNCTURE: CPT | Performed by: OBSTETRICS & GYNECOLOGY

## 2024-08-29 PROCEDURE — 84702 CHORIONIC GONADOTROPIN TEST: CPT

## 2024-08-30 LAB — HCG-TM SERPL-ACNC: <3 IU/L

## 2024-09-27 ENCOUNTER — LAB (OUTPATIENT)
Dept: INFUSION THERAPY | Facility: CLINIC | Age: 44
End: 2024-09-27
Attending: OBSTETRICS & GYNECOLOGY
Payer: COMMERCIAL

## 2024-09-27 DIAGNOSIS — O01.9 GESTATIONAL TROPHOBLASTIC NEOPLASM: ICD-10-CM

## 2024-09-27 LAB
HOLD SPECIMEN: NORMAL
HOLD SPECIMEN: NORMAL

## 2024-09-27 PROCEDURE — 84702 CHORIONIC GONADOTROPIN TEST: CPT

## 2024-09-27 PROCEDURE — 36415 COLL VENOUS BLD VENIPUNCTURE: CPT

## 2024-09-28 LAB — HCG-TM SERPL-ACNC: <3 IU/L

## 2024-10-28 ENCOUNTER — LAB (OUTPATIENT)
Dept: INFUSION THERAPY | Facility: CLINIC | Age: 44
End: 2024-10-28
Attending: OBSTETRICS & GYNECOLOGY
Payer: COMMERCIAL

## 2024-10-28 DIAGNOSIS — O01.9 GESTATIONAL TROPHOBLASTIC NEOPLASM: ICD-10-CM

## 2024-10-28 LAB
HOLD SPECIMEN: NORMAL
HOLD SPECIMEN: NORMAL

## 2024-10-28 PROCEDURE — 36415 COLL VENOUS BLD VENIPUNCTURE: CPT

## 2024-10-28 PROCEDURE — 84702 CHORIONIC GONADOTROPIN TEST: CPT

## 2024-10-29 LAB — HCG-TM SERPL-ACNC: <3 IU/L

## 2024-11-25 ENCOUNTER — LAB (OUTPATIENT)
Dept: INFUSION THERAPY | Facility: CLINIC | Age: 44
End: 2024-11-25
Attending: OBSTETRICS & GYNECOLOGY
Payer: COMMERCIAL

## 2024-11-25 DIAGNOSIS — O01.9 GESTATIONAL TROPHOBLASTIC NEOPLASM: ICD-10-CM

## 2024-11-25 LAB
HOLD SPECIMEN: NORMAL
HOLD SPECIMEN: NORMAL

## 2024-11-25 PROCEDURE — 84702 CHORIONIC GONADOTROPIN TEST: CPT

## 2024-11-25 PROCEDURE — 36415 COLL VENOUS BLD VENIPUNCTURE: CPT

## 2024-11-26 LAB — HCG-TM SERPL-ACNC: <3 IU/L
